# Patient Record
Sex: FEMALE | Race: WHITE | Employment: STUDENT | URBAN - METROPOLITAN AREA
[De-identification: names, ages, dates, MRNs, and addresses within clinical notes are randomized per-mention and may not be internally consistent; named-entity substitution may affect disease eponyms.]

---

## 2023-02-22 PROBLEM — Q93.52 PHELAN-MCDERMID 22Q13 DELETION SYNDROME: Status: ACTIVE | Noted: 2023-02-22

## 2023-02-22 PROBLEM — L70.9 ACNE: Status: ACTIVE | Noted: 2023-02-22

## 2023-02-22 PROBLEM — R53.81 MALAISE AND FATIGUE: Status: RESOLVED | Noted: 2023-02-22 | Resolved: 2023-02-22

## 2023-02-22 PROBLEM — F90.9 ADHD: Status: ACTIVE | Noted: 2023-02-22

## 2023-02-22 PROBLEM — H66.90 ACUTE OTITIS MEDIA: Status: RESOLVED | Noted: 2023-02-22 | Resolved: 2023-02-22

## 2023-02-22 PROBLEM — B37.31 YEAST INFECTION OF THE VAGINA: Status: RESOLVED | Noted: 2023-02-22 | Resolved: 2023-02-22

## 2023-02-22 PROBLEM — E55.9 VITAMIN D DEFICIENCY: Status: RESOLVED | Noted: 2023-02-22 | Resolved: 2023-02-22

## 2023-02-22 PROBLEM — H61.22 IMPACTED CERUMEN OF LEFT EAR: Status: RESOLVED | Noted: 2023-02-22 | Resolved: 2023-02-22

## 2023-02-22 PROBLEM — R53.83 MALAISE AND FATIGUE: Status: RESOLVED | Noted: 2023-02-22 | Resolved: 2023-02-22

## 2023-02-22 PROBLEM — G43.909 MIGRAINES: Status: ACTIVE | Noted: 2023-02-22

## 2023-02-22 PROBLEM — F41.9 ANXIETY: Status: ACTIVE | Noted: 2023-02-22

## 2023-02-22 PROBLEM — K59.09 CHRONIC CONSTIPATION: Status: ACTIVE | Noted: 2023-02-22

## 2023-02-22 PROBLEM — F84.0 AUTISM (HHS-HCC): Status: ACTIVE | Noted: 2023-02-22

## 2023-02-22 PROBLEM — F42.9 OBSESSIVE-COMPULSIVE DISORDER, UNSPECIFIED: Status: ACTIVE | Noted: 2023-02-22

## 2023-02-22 PROBLEM — G47.9 SLEEP DISORDER: Status: RESOLVED | Noted: 2023-02-22 | Resolved: 2023-02-22

## 2023-02-22 PROBLEM — L30.9 ECZEMA: Status: ACTIVE | Noted: 2023-02-22

## 2023-02-22 PROBLEM — R73.9 BORDERLINE HYPERGLYCEMIA: Status: RESOLVED | Noted: 2023-02-22 | Resolved: 2023-02-22

## 2023-02-22 PROBLEM — F41.3 OTHER MIXED ANXIETY DISORDERS: Status: RESOLVED | Noted: 2023-02-22 | Resolved: 2023-02-22

## 2023-02-22 RX ORDER — LEVONORGESTREL AND ETHINYL ESTRADIOL 0.15-0.03
1 KIT ORAL DAILY
COMMUNITY
Start: 2015-02-20 | End: 2023-04-05 | Stop reason: SDUPTHER

## 2023-02-22 RX ORDER — CYPROHEPTADINE HYDROCHLORIDE 4 MG/1
3 TABLET ORAL NIGHTLY
COMMUNITY
Start: 2015-02-20 | End: 2024-03-07 | Stop reason: SDUPTHER

## 2023-02-22 RX ORDER — POLYETHYLENE GLYCOL 3350 17 G/17G
34 POWDER, FOR SOLUTION ORAL DAILY
COMMUNITY
Start: 2018-09-25

## 2023-02-22 RX ORDER — TRAZODONE HYDROCHLORIDE 100 MG/1
2 TABLET ORAL NIGHTLY
COMMUNITY
Start: 2015-03-24 | End: 2024-03-07 | Stop reason: SDUPTHER

## 2023-02-22 RX ORDER — CLONIDINE HYDROCHLORIDE 0.1 MG/1
0.5 TABLET ORAL EVERY MORNING
COMMUNITY
Start: 2021-06-04 | End: 2024-03-07

## 2023-02-22 RX ORDER — NORTRIPTYLINE HYDROCHLORIDE 50 MG/1
1 CAPSULE ORAL NIGHTLY
COMMUNITY
Start: 2019-03-01 | End: 2024-05-21 | Stop reason: SDUPTHER

## 2023-02-22 RX ORDER — CLONIDINE HYDROCHLORIDE 0.1 MG/1
2 TABLET ORAL NIGHTLY
COMMUNITY
End: 2024-03-07

## 2023-02-22 RX ORDER — ASPIRIN 81 MG
5 TABLET, DELAYED RELEASE (ENTERIC COATED) ORAL AS NEEDED
COMMUNITY
Start: 2019-10-28

## 2023-02-22 RX ORDER — ACETYLCYSTEINE 600 MG
2 CAPSULE ORAL 3 TIMES DAILY
COMMUNITY
Start: 2019-03-01

## 2023-02-22 RX ORDER — ACETAMINOPHEN 500 MG
1 TABLET ORAL NIGHTLY
COMMUNITY
Start: 2019-10-18

## 2023-02-22 RX ORDER — CLONAZEPAM 0.5 MG/1
0.25 TABLET ORAL 2 TIMES DAILY PRN
COMMUNITY
Start: 2020-11-30 | End: 2023-10-26 | Stop reason: SINTOL

## 2023-02-24 PROBLEM — F79 INTELLECTUAL DISABILITY: Status: ACTIVE | Noted: 2023-02-24

## 2023-03-23 DIAGNOSIS — N30.00 ACUTE CYSTITIS WITHOUT HEMATURIA: Primary | ICD-10-CM

## 2023-03-23 RX ORDER — NITROFURANTOIN 25; 75 MG/1; MG/1
100 CAPSULE ORAL 2 TIMES DAILY
Qty: 10 CAPSULE | Refills: 0 | Status: SHIPPED | OUTPATIENT
Start: 2023-03-23 | End: 2023-03-28

## 2023-03-23 NOTE — PROGRESS NOTES
# presumed UTI  - UA/UCx  - Macrobid 100mg BID x 5 days  - abx subject to change/stop based on culture results

## 2023-04-03 ASSESSMENT — PROMIS GLOBAL HEALTH SCALE
EMOTIONAL_PROBLEMS: SOMETIMES
RATE_QUALITY_OF_LIFE: GOOD
RATE_MENTAL_HEALTH: FAIR
RATE_AVERAGE_PAIN: 0
RATE_GENERAL_HEALTH: GOOD
CARRYOUT_PHYSICAL_ACTIVITIES: COMPLETELY
CARRYOUT_SOCIAL_ACTIVITIES: GOOD
RATE_SOCIAL_SATISFACTION: GOOD
RATE_AVERAGE_FATIGUE: MILD
RATE_PHYSICAL_HEALTH: GOOD

## 2023-04-05 ENCOUNTER — OFFICE VISIT (OUTPATIENT)
Dept: PRIMARY CARE | Facility: CLINIC | Age: 23
End: 2023-04-05
Payer: MEDICAID

## 2023-04-05 VITALS
DIASTOLIC BLOOD PRESSURE: 84 MMHG | HEART RATE: 80 BPM | BODY MASS INDEX: 31 KG/M2 | SYSTOLIC BLOOD PRESSURE: 127 MMHG | OXYGEN SATURATION: 95 % | TEMPERATURE: 97.6 F | WEIGHT: 175 LBS

## 2023-04-05 DIAGNOSIS — Z00.00 HEALTHCARE MAINTENANCE: Primary | ICD-10-CM

## 2023-04-05 DIAGNOSIS — Q93.52 PHELAN-MCDERMID 22Q13 DELETION SYNDROME: ICD-10-CM

## 2023-04-05 DIAGNOSIS — I42.9 CARDIOMYOPATHY, UNSPECIFIED TYPE (MULTI): ICD-10-CM

## 2023-04-05 DIAGNOSIS — Z30.9 ENCOUNTER FOR CONTRACEPTIVE MANAGEMENT, UNSPECIFIED TYPE: ICD-10-CM

## 2023-04-05 PROCEDURE — 99395 PREV VISIT EST AGE 18-39: CPT | Performed by: STUDENT IN AN ORGANIZED HEALTH CARE EDUCATION/TRAINING PROGRAM

## 2023-04-05 RX ORDER — LEVONORGESTREL AND ETHINYL ESTRADIOL 0.15-0.03
1 KIT ORAL DAILY
Qty: 91 TABLET | Refills: 3 | Status: SHIPPED | OUTPATIENT
Start: 2023-04-05 | End: 2024-03-07 | Stop reason: SDUPTHER

## 2023-04-05 NOTE — PROGRESS NOTES
Subjective   Patient ID: Elsa Bell is a 23 y.o. female who presents for Annual Exam.    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    Elsa here today with mother Anderson . She is doing well at her adult day care program. Had a recent UTI that was treated with Macrobid, back to her baseline. No hospitalizations or ED visits.     Re: Autism - essentially nonverbal. No longer on stimulants, has done wonders for her anxiety. See psych and neuro notes; on a few meds and doing well.     Re: CM? - most recent TTE stable 2019. No BP issues. No CP. Energy levels are OK.     Re: GI - uses miralax twice a day, has loose stools or else she'll be constipated and won't have any BMs. Struggles with urinary incontinence on occasion as well.    PMHx, FHx, Social Hx, Surg Hx personally reviewed at this appointment. No pertinent findings and/or changes from prior (if applicable).    ROS: Denies wt gain/loss f/c HA LoC CP SOB NVDC. See HPI above, and scanned sheet (if applicable). All other systems are reviewed and are without complaint.             Review of Systems    Objective   /84   Pulse 80   Temp 36.4 °C (97.6 °F)   Wt 79.4 kg (175 lb)   SpO2 95%   BMI 31.00 kg/m²     Physical Exam  Gen: syndromic appearance. Largely nonverbal.   HEENT: NC/AT. Anicteric sclera, symmetric pupils. MMM no thrush.  Neck: Soft, supple. No LAD. No goiter.   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    Relevant Orders    Transthoracic Echo (TTE) Complete    CBC and Auto Differential    Comprehensive Metabolic Panel    TSH with reflex to Free T4 if abnormal    Lipid Panel     Other  Visit Diagnoses       Healthcare maintenance    -  Primary    Relevant Medications    levonorgestreL-ethinyl estrad (Seasonale) 0.15 mg-30 mcg (91) tablet    Other Relevant Orders    Transthoracic Echo (TTE) Complete    CBC and Auto Differential    Comprehensive Metabolic Panel    TSH with reflex to Free T4 if abnormal    Lipid Panel    Encounter for contraceptive management, unspecified type        Relevant Medications    levonorgestreL-ethinyl estrad (Seasonale) 0.15 mg-30 mcg (91) tablet    Cardiomyopathy, unspecified type (CMS/HCC)        Relevant Orders    Transthoracic Echo (TTE) Complete    CBC and Auto Differential    Comprehensive Metabolic Panel    TSH with reflex to Free T4 if abnormal    Lipid Panel

## 2023-04-05 NOTE — PATIENT INSTRUCTIONS
Please stop at the lab ([address]) to complete your blood and/or urine work that I've ordered for you.    I will contact you with the results at my soonest convenience. I strongly urge you to use "[employer] as this is the quickest and easiest way to access your results and recieve my correspondences.     I have ordered an echocardiogram to better evaluate your heart. Please stop by the cardiology department on the third floor or call [phone] to schedule this study.

## 2023-04-21 ENCOUNTER — LAB (OUTPATIENT)
Dept: LAB | Facility: LAB | Age: 23
End: 2023-04-21
Payer: MEDICAID

## 2023-04-21 DIAGNOSIS — Q93.52 PHELAN-MCDERMID 22Q13 DELETION SYNDROME: ICD-10-CM

## 2023-04-21 DIAGNOSIS — Z00.00 HEALTHCARE MAINTENANCE: ICD-10-CM

## 2023-04-21 DIAGNOSIS — I42.9 CARDIOMYOPATHY, UNSPECIFIED TYPE (MULTI): ICD-10-CM

## 2023-04-21 LAB
ALANINE AMINOTRANSFERASE (SGPT) (U/L) IN SER/PLAS: 20 U/L (ref 7–45)
ALBUMIN (G/DL) IN SER/PLAS: 4 G/DL (ref 3.4–5)
ALKALINE PHOSPHATASE (U/L) IN SER/PLAS: 56 U/L (ref 33–110)
ANION GAP IN SER/PLAS: 10 MMOL/L (ref 10–20)
ASPARTATE AMINOTRANSFERASE (SGOT) (U/L) IN SER/PLAS: 16 U/L (ref 9–39)
BASOPHILS (10*3/UL) IN BLOOD BY AUTOMATED COUNT: 0.04 X10E9/L (ref 0–0.1)
BASOPHILS/100 LEUKOCYTES IN BLOOD BY AUTOMATED COUNT: 0.6 % (ref 0–2)
BILIRUBIN TOTAL (MG/DL) IN SER/PLAS: 0.4 MG/DL (ref 0–1.2)
CALCIUM (MG/DL) IN SER/PLAS: 9.2 MG/DL (ref 8.6–10.6)
CARBON DIOXIDE, TOTAL (MMOL/L) IN SER/PLAS: 28 MMOL/L (ref 21–32)
CHLORIDE (MMOL/L) IN SER/PLAS: 107 MMOL/L (ref 98–107)
CHOLESTEROL (MG/DL) IN SER/PLAS: 145 MG/DL (ref 0–199)
CHOLESTEROL IN HDL (MG/DL) IN SER/PLAS: 42.4 MG/DL
CHOLESTEROL/HDL RATIO: 3.4
CREATININE (MG/DL) IN SER/PLAS: 0.79 MG/DL (ref 0.5–1.05)
EOSINOPHILS (10*3/UL) IN BLOOD BY AUTOMATED COUNT: 0.08 X10E9/L (ref 0–0.7)
EOSINOPHILS/100 LEUKOCYTES IN BLOOD BY AUTOMATED COUNT: 1.3 % (ref 0–6)
ERYTHROCYTE DISTRIBUTION WIDTH (RATIO) BY AUTOMATED COUNT: 12.8 % (ref 11.5–14.5)
ERYTHROCYTE MEAN CORPUSCULAR HEMOGLOBIN CONCENTRATION (G/DL) BY AUTOMATED: 32.4 G/DL (ref 32–36)
ERYTHROCYTE MEAN CORPUSCULAR VOLUME (FL) BY AUTOMATED COUNT: 93 FL (ref 80–100)
ERYTHROCYTES (10*6/UL) IN BLOOD BY AUTOMATED COUNT: 4.4 X10E12/L (ref 4–5.2)
GFR FEMALE: >90 ML/MIN/1.73M2
GLUCOSE (MG/DL) IN SER/PLAS: 92 MG/DL (ref 74–99)
HEMATOCRIT (%) IN BLOOD BY AUTOMATED COUNT: 40.7 % (ref 36–46)
HEMOGLOBIN (G/DL) IN BLOOD: 13.2 G/DL (ref 12–16)
IMMATURE GRANULOCYTES/100 LEUKOCYTES IN BLOOD BY AUTOMATED COUNT: 0.3 % (ref 0–0.9)
LDL: 79 MG/DL (ref 0–119)
LEUKOCYTES (10*3/UL) IN BLOOD BY AUTOMATED COUNT: 6.3 X10E9/L (ref 4.4–11.3)
LYMPHOCYTES (10*3/UL) IN BLOOD BY AUTOMATED COUNT: 2.06 X10E9/L (ref 1.2–4.8)
LYMPHOCYTES/100 LEUKOCYTES IN BLOOD BY AUTOMATED COUNT: 33 % (ref 13–44)
MONOCYTES (10*3/UL) IN BLOOD BY AUTOMATED COUNT: 0.36 X10E9/L (ref 0.1–1)
MONOCYTES/100 LEUKOCYTES IN BLOOD BY AUTOMATED COUNT: 5.8 % (ref 2–10)
NEUTROPHILS (10*3/UL) IN BLOOD BY AUTOMATED COUNT: 3.69 X10E9/L (ref 1.2–7.7)
NEUTROPHILS/100 LEUKOCYTES IN BLOOD BY AUTOMATED COUNT: 59 % (ref 40–80)
NRBC (PER 100 WBCS) BY AUTOMATED COUNT: 0 /100 WBC (ref 0–0)
PLATELETS (10*3/UL) IN BLOOD AUTOMATED COUNT: 242 X10E9/L (ref 150–450)
POTASSIUM (MMOL/L) IN SER/PLAS: 4 MMOL/L (ref 3.5–5.3)
PROTEIN TOTAL: 6.7 G/DL (ref 6.4–8.2)
SODIUM (MMOL/L) IN SER/PLAS: 141 MMOL/L (ref 136–145)
THYROTROPIN (MIU/L) IN SER/PLAS BY DETECTION LIMIT <= 0.05 MIU/L: 0.53 MIU/L (ref 0.44–3.98)
TRIGLYCERIDE (MG/DL) IN SER/PLAS: 119 MG/DL (ref 0–149)
UREA NITROGEN (MG/DL) IN SER/PLAS: 13 MG/DL (ref 6–23)
VLDL: 24 MG/DL (ref 0–40)

## 2023-04-21 PROCEDURE — 80061 LIPID PANEL: CPT

## 2023-04-21 PROCEDURE — 84443 ASSAY THYROID STIM HORMONE: CPT

## 2023-04-21 PROCEDURE — 85025 COMPLETE CBC W/AUTO DIFF WBC: CPT

## 2023-04-21 PROCEDURE — 80053 COMPREHEN METABOLIC PANEL: CPT

## 2023-04-21 PROCEDURE — 36415 COLL VENOUS BLD VENIPUNCTURE: CPT

## 2023-04-30 ENCOUNTER — PATIENT MESSAGE (OUTPATIENT)
Dept: PRIMARY CARE | Facility: CLINIC | Age: 23
End: 2023-04-30
Payer: MEDICAID

## 2023-04-30 DIAGNOSIS — N30.00 ACUTE CYSTITIS WITHOUT HEMATURIA: Primary | ICD-10-CM

## 2023-05-04 ENCOUNTER — LAB (OUTPATIENT)
Dept: LAB | Facility: LAB | Age: 23
End: 2023-05-04
Payer: MEDICAID

## 2023-05-04 DIAGNOSIS — N30.00 ACUTE CYSTITIS WITHOUT HEMATURIA: ICD-10-CM

## 2023-05-04 LAB
BACTERIA, URINE: ABNORMAL /HPF
MUCUS, URINE: ABNORMAL /LPF
RBC, URINE: ABNORMAL /HPF (ref 0–5)
SQUAMOUS EPITHELIAL CELLS, URINE: <1 /HPF
TRANSITIONAL EPITHELIAL CELLS, URINE: <1 /HPF
WBC, URINE: 1 /HPF (ref 0–5)

## 2023-05-04 PROCEDURE — 81001 URINALYSIS AUTO W/SCOPE: CPT

## 2023-05-04 PROCEDURE — 87086 URINE CULTURE/COLONY COUNT: CPT

## 2023-05-05 LAB — URINE CULTURE: NORMAL

## 2023-10-04 ENCOUNTER — TELEMEDICINE (OUTPATIENT)
Dept: BEHAVIORAL HEALTH | Facility: CLINIC | Age: 23
End: 2023-10-04
Payer: MEDICAID

## 2023-10-04 DIAGNOSIS — Q93.52 PHELAN-MCDERMID 22Q13 DELETION SYNDROME: ICD-10-CM

## 2023-10-04 DIAGNOSIS — F42.8 OTHER OBSESSIVE-COMPULSIVE DISORDERS: Primary | Chronic | ICD-10-CM

## 2023-10-04 DIAGNOSIS — F84.0 AUTISM SPECTRUM DISORDER (HHS-HCC): ICD-10-CM

## 2023-10-04 DIAGNOSIS — K59.09 CHRONIC CONSTIPATION: ICD-10-CM

## 2023-10-04 DIAGNOSIS — G47.9 SLEEP DISORDER: ICD-10-CM

## 2023-10-04 DIAGNOSIS — F41.3 OTHER MIXED ANXIETY DISORDERS: ICD-10-CM

## 2023-10-04 DIAGNOSIS — F79 INTELLECTUAL DISABILITY: ICD-10-CM

## 2023-10-04 DIAGNOSIS — F90.2 ATTENTION DEFICIT HYPERACTIVITY DISORDER (ADHD), COMBINED TYPE: ICD-10-CM

## 2023-10-04 PROCEDURE — 99214 OFFICE O/P EST MOD 30 MIN: CPT | Performed by: STUDENT IN AN ORGANIZED HEALTH CARE EDUCATION/TRAINING PROGRAM

## 2023-10-04 NOTE — PATIENT INSTRUCTIONS
AFTER VISIT SUMMARY      Date: 10/04/2023  Appointment with Psychiatrist - Dr. Rodriguez        Reason for Visit:  -Routine follow-up/Medication management  --Recent behavioral outburst        Discussed during Appointment:   -Behavioral concerns   -Sleep  -Seizures and EEG findings  -Possible causes of outbursts  -Medication  -Strategies for managing challenging behavioral outbursts        Clinical Impression/Status Update:  Patient had a recent episode of a behavioral outburst but reportedly back to her psychiatric/behavioral baseline  -Medication regimen appropriately effective without report of significant side effects          INSTRUCTIONS/RECOMMENDATIONS      #Medication   -Medication changes made today:   --We are starting you on Ativan 1 mg as needed up to twice a day for agitation  --We are discontinuing your Klonopin 0.5 mg       Please note:   Prescriptions will be sent to your pharmacy with enough refills to last until your next appointment. For established patients, we typically provide 6 refills for regular meds and 3 refills for controlled substances (e.g., ADHD stimulant medication, benzodiazepines such as lorazepam). We will not provide additional refills beyond that without having an appointment. You can schedule an appointment by calling our office at [phone].     #Labs  Labs needed to make sure there are no side effects to medications  Get them done 1 week before next appointment  Labs ordered, you do not need a printed lab order. Needs to be done at a  lab  You can find the nearest location here:  [email or web address]   Patient to fast for 12 hours prior to blood draw.      #To-Do Prior to Next Visit:   -Please get bloodwork/labs done at least one week prior to your next appointment  -Download the e[employer]ise erum to track symptoms          #Follow-up  --We would like to see you again in about 3 weeks  --> Please call the office ([phone]) to  schedule an appointment at your earliest convenience.    -If having new/worsening symptoms, please call the clinic ([phone]) to discuss being seen sooner     Please note:  We ask that you please schedule a visit if needing paperwork filled out by the doctor (e.g., Expert Eval, FMLA form)

## 2023-10-04 NOTE — PROGRESS NOTES
PRESENT FOR APPOINTMENT  -Mother (Bethany)      LAST INTERVENTION  No med changes        HISTORY OF PRESENT ILLNESS    -Interval Change  The past week, there has been episodes of behavioral outbursts and sleep changes.         -Anxiety  Stable. No reported difference in her worrying and perseverating. Instead, she has been more impulsive and impatient which is similar to her last visit.  Recall from prior: Baseline anxiety typically consists of asking questions repeatedly and getting preoccupied with something that is worrisome to her.       -Behavior  Worse.   -Behavioral outburst: Mother reports an episode of challenging behavior this past Saturday. A week prior, mother noticed behavior ramping up. Sitting up, and going back to bed as reviewed by video camera. Mother noted that patient wasn't awake. Was uncharacteristically agitated, leaped over furniture and broke down the door and destroyed property. Given safety concerns, neighbors offered 0.5 mg ativan and after an hour, she calmed down but was not sedated and she remained functional, ambulating fine. Went to bed tossing and turning and became agitated at 2 am and other 0.5 was given with good results. The following day, she slept most of the day and night and has returned to her psychiatric/behavioral baseline and has remained at baseline.      Mother did not report any other signs/symptoms suggesting organic etiology of behavior exacerbation aside from sleep disturbance.     There was a concern for staring spells and possible seizures when patient was a child. Has series of EEGs There were some results that suggested signs of seizures. Eventually, it was decided not to be treated and eventually went away before her diagnosis of [place] McDermid syndrome. Last normal EEG was in 2006.    Recall from prior: patient is described as having times where she will ignore and/or refuse to follow instructions and engage in desired activity on her own terms instead. There is  no report of significant irritability or argumentativeness in response to instructions. Instead, mother explains that patient does what she wants when she wants to. Will not wait patiently like before. Also, will reportedly “do what she needs to” in order to be able to do what she wants, including pushing/shoving and at times hitting others. Once patient has done what she wanted to do, the behavior terminates on its own and in most cases patient will be her usual self the rest of the time.   Overall course of this sort of behavior has varied in presence/frequency. Comes and goes. Can last up to a few days. Typically resolves in less than a week. Occurs in finite episodes rather than all the time during those days. After an incident patient goes back to her usual self the rest of the day. Severity has remained stable since onset. This behavior per mum never gets severe or frequent enough to call our office about challenging behavior.  No report of property destruction or significant self-injurious behavior.      -Sleep  As prior, reports of tossing and turning while sleeping. Recall from prior: Regular sleep schedule, adequate sleep time (at least 8 hours most nights), restful. No report of morning sedation.       -Medication  Endorses medication adherence. No report of excessive sedation, dizziness, tremors, stiffness, drooling, shuffling gait. No report of significant issues with constipation recently.   0.5 mg ativan = after an hour, she calmed down but was not sedated and she remained functional, ambulating fine. Went to bed tossing and turning and became agitated at 2 am and other 0.5 was given with good results.       -Health  No report of hospitalizations, ED visits, new/worsening medical issues, or changes in non-psych medication since last visit. Reports being fully covid vaccinated. Rare migraines consistent with baseline.    No report of signs/symptoms suggesting organic etiology of behavior exacerbation  aside from sleep disturbance.         REVIEW OF SYMPTOMS  -Depression: negative  -Anxiety: see HPI  -Psychosis: negative  -Kathrine: negative  -Aggression: see HPI  -Self-injury: skin-picking, no worse than baseline  -Sleep: as per HPI  -Appetite: No issues reported. No report of pica. No changes from baseline  -Medical ROS: patient does not endorse difficulty urinating, constipation, seizures, rash, or polydipsia.  *All other systems have been reviewed and are negative for complaint unless otherwise noted above      PSYCHIATRIC HISTORY  No prior hospitalizations    MEDICATION HISTORY  Amitriptyline - worse aggression?  Clomipramine - worse aggression?  MPH stimulants (Metadate) - unknown outcome  Guanfacine - unclear benefit  Vyvanse - helpful initially but recently causing some aggression    MEDICAL HISTORY  PCP: Dr. Aidan Vázquez (peds)  No h/o seizures  Constipation  Cyclical vomiting syndrome  Migraine headaches    FAMILY PSYCHIATRIC HISTORY  Older brother and sister with depression and anxiety, respectively  No reported history of neurodevelopmental disorders    SOCIAL HISTORY  Living situation: Lives with mom  School, work, training: [employer] 5 days/week  Guardianship status: Mom  Trauma history: Not discussed today  Tobacco: None  Alcohol: None  Non-rx drugs: None   Caffeine: None      Mental Status Exam  General: adequate hygiene/grooming.  Behavior: distant, mildly avoidant  Communication: Stereotypic words/phrases.  Motor: No involuntary movements.  MSK: No TD, tics, or tremor appreciated. AIMS 0.   Mood: Euthymic  Affect: Full range. Appropriately reactive; smiles/laughs when watching something funny.  Thought processes: Coherent. [place]  Thought content: Unable to assess due to limited verbal abilities and ID  Perception: Does not appear to be experiencing hallucinations.  Orientation: Responds to her name.  Intellectual ability: Impaired.  Attention/concentration: alert; attentive.  Insight:  Poor  Judgment: Poor         RISK ASSESSMENT   Patient felt to be at low acute and imminent risk of harm to self and others based on consideration of her risk and protective factors, as well as evaluation of her current clinical condition. She does not currently meet criteria for inpatient psychiatric hospitalization given that she does not exhibit evidence of significant deterioration in baseline psychiatric illness, aggression, self-injury, and ability to care for self. There is no evidence that patient’s current caregivers/living environment are unable to safely manage patient’s behavior. Overall risk mitigated by continued psychiatric follow-up care, psychopharmacologic intervention, 24/7 supervision, and [facility] [place]. Patient/caregivers are aware of emergency psychiatric resources available in the event of an acute psychiatric emergency, Mobile Crisis, 911, and local ER.        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         AS OF THIS APPOINTMENT:  Report of recent challenging behavior, however, patient is reported to be back to behavioral and psychiatric baseline at this time. Report of good response to Ativan prn so will place order for that and discotninue Klonopin as that was not effective. Will continue other medications and maintain close follow-up.     -Continue scheduled medication.   -Add Ativan 1 mg up to twice a day PRN for agitation.   -Discontinue Klonopin 0.5 mg PRN as this was ineffective   -Will plan to order standing labs for medical workup in case of future behavioral exacerbations  -Patient to download bearable erum to track symptoms  -Follow up in 3 to 4 weeks  -Will plan to look at the literature with regards to sleep and seizures related to [place] McDermid syndrome.        Diagnoses:  -[place]-McDermid syndrome  -Moderate Intellectual disability  -ASD  -OCD  -GRACE  -ADHD      PLAN**MED CHANGES**  Visit  of high complexity given patient’s limited communication ability and intellectual disability, and the resulting need for the presence of a third party (mother) to provide clinical information and history.     #Medication Management:   -Start  --Lorazepam 1 mg PRN up to twice a day for agitation  -Discontinue Klonopin 0.5 mg PRN  -Continue   -Clonidine PRN 0.1mg bid for anxiety/agitation  --Take half tablet PRN bid. If minimal response within about 45 minutes, give another half tablet  - Continue Trazodone to 200mg  - Continue nortriptyline 50mg QHS for ADHD and migraines  - Continue cyproheptadine 12mg QHS for migraines  - Continue NAC 1000mg TID for OCD/skin picking    #Medication Considerations:  -Risks, benefits, alternatives, off-label uses, black box warnings, and frequent/important side effects of medications have been discussed with patient/caregiver. Parent/guardian has voiced understanding and agreement with recommended use of psychotropic medication. Patient has assented to the extent possible with recommended use of psychotropic medication.  -Patient/caregiver asked to call [phone] when needing refills, with at least one week’s notice    -Medical necessity for treatment with psychotropic medication:   [x] Symptom reduction    [x] Improvement in functioning   [x] Reduce risk of harm to self/others   [ ] Maintenance therapy to prevent deterioration in functioning     -Reason for not reducing medication dose at this time:     [x] Significant risk of deterioration in functioning    [x] Concern for elevating risk of harm to self/others   [ ] Prior dose reduction unsuccessful and/or harmful   [ ] Medication regimen recently modified    [x] Psychiatric/behavioral condition not adequately stabilized/optimized    [ ] Other     There is no report of signs/symptoms consistent with medication-induced impairment in daily functioning. At this time, benefits of medication felt to outweigh potential risks. But we  will continue to reassess need for psychotropic medication at regular 3 to 6 month intervals, or sooner as clinically indicated.    -OARRS: I have personally reviewed patient’s OARRS report. I have considered the risks of abuse, dependence, addiction, and diversion and feel that the potential benefits of treatment with a controlled substance currently outweigh the potential risks.   Last checked OARRS on 05/22/2023.      #Medication Monitoring:   -Patient not currently taking medications requiring routine laboratory monitoring         #Have provided psychoeducation on sleep hygiene, medication, etiology of behavioral issues, behavioral interventions      #Counseling Provided   [x] Diagnostic impression/prognosis    [x] Risks and benefits of treatment options   [x] Instruction for management/treatment and follow-up   [x] Educated patient/caregiver about: behavioral interventions, harm reduction, safety planning       #Coordination of care provided   [x] Family/Caregiver [ ] Guardian [ ] Physician    [ ] Residential Staff [ ] Nursing staff [ ]     [ ] SSA/ [ ] Therapist [ ] Pharmacist      #MDM/Complexity Issues   [ ] Chronic medical comorbidities    [ ] Chronic risk of harm to self/others    [x] Intellectual disability    [x] Need for independent historian due to impaired communication abilities and intellectual disability    [x] Controlled substance medication requiring regular monitoring    [ ] Medication requiring significant ongoing monitoring for toxicity      Follow up in 3 weeks, or sooner if new/worsening symptoms    Time:  Prep time on date of the patient encounter: 10 minutes  Time spent directly with patient/family/caregiver: 40 minutes  Additional time spent on patient care activities: 10 minutes   Documentation time: 10 minutes  Total time on date of patient encounter: 70 minutes        Scribe Attestation  By signing my name below, IArely , Scribe   attest that this  documentation has been prepared under the direction and in the presence of Ilana Rodriguez DO.

## 2023-10-10 ENCOUNTER — TELEPHONE (OUTPATIENT)
Dept: BEHAVIORAL HEALTH | Facility: CLINIC | Age: 23
End: 2023-10-10
Payer: MEDICAID

## 2023-10-13 RX ORDER — LORAZEPAM 1 MG/1
TABLET ORAL
Qty: 45 TABLET | Refills: 1 | Status: SHIPPED | OUTPATIENT
Start: 2023-10-13

## 2023-10-25 PROBLEM — G47.9 SLEEP DISORDER: Status: ACTIVE | Noted: 2023-10-25

## 2023-10-25 PROBLEM — F41.3 OTHER MIXED ANXIETY DISORDERS: Status: ACTIVE | Noted: 2023-10-25

## 2023-10-25 PROBLEM — F42.8 OTHER OBSESSIVE-COMPULSIVE DISORDER: Status: ACTIVE | Noted: 2023-02-22

## 2023-10-26 ENCOUNTER — TELEMEDICINE (OUTPATIENT)
Dept: BEHAVIORAL HEALTH | Facility: CLINIC | Age: 23
End: 2023-10-26
Payer: MEDICAID

## 2023-10-26 DIAGNOSIS — F84.0 AUTISM SPECTRUM DISORDER (HHS-HCC): ICD-10-CM

## 2023-10-26 DIAGNOSIS — F79 INTELLECTUAL DISABILITY: ICD-10-CM

## 2023-10-26 DIAGNOSIS — F42.8 OTHER OBSESSIVE-COMPULSIVE DISORDER: Primary | ICD-10-CM

## 2023-10-26 DIAGNOSIS — F41.3 OTHER MIXED ANXIETY DISORDERS: ICD-10-CM

## 2023-10-26 DIAGNOSIS — Q93.52 PHELAN-MCDERMID 22Q13 DELETION SYNDROME: ICD-10-CM

## 2023-10-26 DIAGNOSIS — G47.9 SLEEP DISORDER: ICD-10-CM

## 2023-10-26 PROBLEM — I51.89 MILD LEFT VENTRICULAR SYSTOLIC DYSFUNCTION: Status: ACTIVE | Noted: 2023-10-26

## 2023-10-26 PROBLEM — H10.9 CONJUNCTIVITIS: Status: RESOLVED | Noted: 2023-10-26 | Resolved: 2023-10-26

## 2023-10-26 PROBLEM — R62.50 LACK OF EXPECTED NORMAL PHYSIOLOGICAL DEVELOPMENT: Status: RESOLVED | Noted: 2017-01-16 | Resolved: 2023-10-26

## 2023-10-26 PROCEDURE — 99214 OFFICE O/P EST MOD 30 MIN: CPT | Performed by: STUDENT IN AN ORGANIZED HEALTH CARE EDUCATION/TRAINING PROGRAM

## 2023-10-26 NOTE — PROGRESS NOTES
PRESENT FOR APPOINTMENT  -Mother (Bethany)  -pt    LAST INTERVENTION  Last seen about 3 weeks ago in October 2023. Ativan PRN was prescribed and Klonopin was discontinued. Mother was asked to download the MyFeelBack erum to track symptoms.        HISTORY OF PRESENT ILLNESS    -Interval Change  No new issues/concerns.      -Anxiety  Stable. No report of excessive worrying above baseline.   Recall from prior: Baseline anxiety typically consists of asking questions repeatedly and getting preoccupied with something that is worrisome to her.       -Behavior  Stable. No reports of behavioral outbursts above baseline.  Recall from prior:   Sitting up, and going back to bed as reviewed by video camera. Mother noted that patient wasn't awake. Agitation and destruction of property. Patient is described as having times where she will ignore and/or refuse to follow instructions and engage in desired activity on her own terms instead. Mother explains that patient does what she wants when she wants to. Will not wait patiently like before. Also, will reportedly “do what she needs to” in order to be able to do what she wants, including pushing/shoving and at times hitting others. Once patient has done what she wanted to do, the behavior terminates on its own and in most cases patient will be her usual self the rest of the time. Overall course of this sort of behavior has varied in presence/frequency. Comes and goes. Can last up to a few days. Typically resolves in less than a week. Occurs in finite episodes rather than all the time during those days. After an incident patient goes back to her usual self the rest of the day. Severity has remained stable since onset. This behavior per mum never gets severe or frequent enough to call our office about challenging behavior. No report of significant self-injurious behavior.      -Sleep  Stable. Between 12 and 5 am, mother reports 4 to 10 awakenings and then goes back to sleep. As prior, reports  of tossing and turning while sleeping. One night, patient stayed up for one full hour. Recall from prior: Regular sleep schedule, adequate sleep time (at least 8 hours most nights), restful. No report of morning sedation.       -Medication  Mother reportedly gave her Ativan one time since the last visit and that she did not notice it's effect right away. Mother reports increased sedation the next day.   Endorses medication adherence. No report of excessive sedation, dizziness, tremors, stiffness, drooling, shuffling gait. No report of significant issues with constipation recently.       -Health  Stable. Mother reports that she wants patient to get a thyroid panel given her family history of thyroid conditions.  No report of hospitalizations, ED visits, new/worsening medical issues, or changes in non-psych medication since last visit. Reports being fully covid vaccinated. Rare migraines consistent with baseline.  No report of signs/symptoms suggesting organic etiology of behavior exacerbation aside from sleep disturbance.         REVIEW OF SYMPTOMS  -Depression: negative  -Anxiety: see HPI  -Psychosis: negative  -Kathrine: negative  -Aggression: see HPI  -Self-injury: skin-picking, no worse than baseline  -Sleep: as per HPI  -Appetite: No issues reported. No report of pica. No changes from baseline  -Medical ROS: patient does not endorse difficulty urinating, constipation, seizures, rash, or polydipsia.  *All other systems have been reviewed and are negative for complaint unless otherwise noted above      PSYCHIATRIC HISTORY  No prior hospitalizations  There was a concern for staring spells and possible seizures when patient was a child. Has series of EEGs There were some results that suggested signs of seizures. 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 Last normal EEG was in 2006.      MEDICATION HISTORY  Amitriptyline - worse  aggression?  Clomipramine - worse aggression?  MPH stimulants (Metadate) - unknown outcome  Guanfacine - unclear benefit  Vyvanse - helpful initially but recently causing some aggression      MEDICAL HISTORY  PCP: Dr. 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  Behavior: distant, mildly avoidant  Communication: Stereotypic words/phrases.  Motor: No involuntary movements.  MSK: No TD, tics, or tremor appreciated. AIMS 0.   Mood: Euthymic  Affect: Full range. Appropriately reactive; smiles/laughs when watching something funny.  Thought processes: Coherent. [place]  Thought content: Unable to assess due to limited verbal abilities and ID  Perception: Does not appear to be experiencing hallucinations.  Orientation: Responds to her name.  Intellectual ability: Impaired.  Attention/concentration: alert; attentive.  Insight: Poor  Judgment: Poor         RISK ASSESSMENT   Patient felt to be at low acute and imminent risk of harm to self and others based on consideration of her risk and protective factors, as well as evaluation of her current clinical condition. She does not currently meet criteria for inpatient psychiatric hospitalization given that she does not exhibit evidence of significant deterioration in baseline psychiatric  illness, aggression, self-injury, and ability to care for self. There is no evidence that patient’s current caregivers/living environment are unable to safely manage patient’s behavior. Overall risk mitigated by continued psychiatric follow-up care, psychopharmacologic intervention, 24/7 supervision, and [facility] services. Patient/caregivers are aware of emergency psychiatric resources available in the event of an acute psychiatric emergency, Mobile Crisis, 911, and local ER.      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       ###  AS OF THIS APPOINTMENT:  Patient appears to be close to her psychiatric and behavioral baseline. Mother reported excessive sedation the next day after taking Ativan so will try prn Valium instead  As prior, reports of tossing and turning while sleeping. Discussed the possibility of a sleep study with mother. Mother noted reservations about patient's ability to sleep outside the home. Will look into getting a child life specialist to help with the process.   Mother expressing desire for patient to see endocrinologist given significant family history of thyroid issues. Of note, patient's TSH has been low normal. Will plan to provide referral if necessary  Follow up in 6 weeks.  ###      Diagnoses:  -[place]-McDermid syndrome  -Moderate Intellectual disability  -ASD  -OCD  -GRACE  -ADHD      PLAN       **MED CHANGES**  Visit of high complexity given patient’s limited communication ability and intellectual disability, and the resulting need for the presence of a third party (mother) to provide clinical information and history.     #Medication Management:   -Start  --Valium 5 mg PRN and another 5 mg if not effective within 45 minutes for agitation  -Continue   --Lorazepam 1 mg PRN up to twice a day for agitation  --Clonidine PRN 0.1mg bid for anxiety/agitation  ---Take half tablet PRN bid. If  minimal response within about 45 minutes, give another half tablet  - Continue Trazodone 200mg QHS  - Continue nortriptyline 50mg QHS for ADHD and migraines  - Continue cyproheptadine 12mg QHS for migraines  - Continue NAC 1000mg TID for OCD/skin picking      #Medication Considerations:  -Risks, benefits, alternatives, off-label uses, black box warnings, and frequent/important side effects of medications have been discussed with patient/caregiver. Parent/guardian has voiced understanding and agreement with recommended use of psychotropic medication. Patient has assented to the extent possible with recommended use of psychotropic medication.  -Patient/caregiver asked to call [phone] when needing refills, with at least one week’s notice    -Medical necessity for treatment with psychotropic medication:   [x] Symptom reduction    [x] Improvement in functioning   [x] Reduce risk of harm to self/others   [ ] Maintenance therapy to prevent deterioration in functioning     -Reason for not reducing medication dose at this time:     [x] Significant risk of deterioration in functioning    [x] Concern for elevating risk of harm to self/others   [ ] Prior dose reduction unsuccessful and/or harmful   [ ] Medication regimen recently modified    [x] Psychiatric/behavioral condition not adequately stabilized/optimized    [ ] Other     There is no report of signs/symptoms consistent with medication-induced impairment in daily functioning. At this time, benefits of medication felt to outweigh potential risks. But we will continue to reassess need for psychotropic medication at regular 3 to 6 month intervals, or sooner as clinically indicated.    -OARRS: I have personally reviewed patient’s OARRS report. I have considered the risks of abuse, dependence, addiction, and diversion and feel that the potential benefits of treatment with a controlled substance currently outweigh the potential risks.   Last checked OARRS on  05/22/2023.      #Medication Monitoring:   -Patient not currently taking medications requiring routine laboratory monitoring   --TSH done about 6 months ago was low normal.      #Have provided psychoeducation on sleep hygiene, medication, etiology of behavioral issues, behavioral interventions      #Counseling Provided   [x] Diagnostic impression/prognosis    [x] Risks and benefits of treatment options   [x] Instruction for management/treatment and follow-up   [x] Educated patient/caregiver about: behavioral interventions, harm reduction, safety planning       #Coordination of care provided   [x] Family/Caregiver [ ] Guardian [ ] Physician    [ ] Residential Staff [ ] Nursing staff [ ]     [ ] SSA/ [ ] Therapist [ ] Pharmacist      #MDM/Complexity Issues   [ ] Chronic medical comorbidities    [ ] Chronic risk of harm to self/others    [x] Intellectual disability    [x] Need for independent historian due to impaired communication abilities and intellectual disability    [x] Controlled substance medication requiring regular monitoring    [ ] Medication requiring significant ongoing monitoring for toxicity      Follow up in 6 weeks, or sooner if new/worsening symptoms    Time:  Prep time on date of the patient encounter: 10 minutes  Time spent directly with patient/family/caregiver: 40 minutes  Additional time spent on patient care activities: 10 minutes   Documentation time: 10 minutes  Total time on date of patient encounter: 70 minutes        Scribe Attestation  By signing my name below, IArely , [employer]   attest that this documentation has been prepared under the direction and in the presence of Ilana Rodriguez DO.

## 2023-10-27 NOTE — PATIENT INSTRUCTIONS
AFTER VISIT SUMMARY      Date: 10/26/2023  Appointment with Psychiatrist - Dr. Rodriguez      Reason for Visit:  -Routine follow-up/Medication management      Discussed during Appointment:   -Sleep changes and sleep study  -Child life specialist  -Medication  -Intended endocrinology appointment for thyroid check.      Clinical Impression/Status Update:  -Appears to be close to her psychiatric and behavioral baseline. However, Elsa still has the issue of tossing and turning while asleep.  ---We will contact a child life specialist to see the possibility of easing the process of a sleep study.  -We will try Valium as needed for agitation.      #Clinic Policies/Procedures  -Refills  Prescriptions will be sent to your pharmacy with enough refills to last until your next appointment. For established patients, we typically provide 6 refills for regular meds and 3 refills for controlled substances (e.g., ADHD stimulant medication, benzodiazepines such as lorazepam). We will not provide additional refills beyond that without having an appointment. You can schedule an appointment by calling our office at [phone].     -Paperwork Requests (e.g., [employer] for guardianship)  We ask that you please schedule an appointment if needing paperwork filled out by the doctor (e.g., [employer], FMLA form).  Please provide as much information as possible about your request and email the doctor any forms needing to be filled out prior to the appointment.       ===========================  INSTRUCTIONS/RECOMMENDATIONS  ===========================    #Medication   -Medication changes made today:   --We are starting you on Valium 5 mg as needed and another 5 mg if first dose is not effective within 45 minutes       #Follow-up  -We would like to see you again in about 6 weeks  --> Please call the office ([phone]) to schedule an appointment at your earliest convenience.    *If having new/worsening symptoms, please call the  [facility] ([phone]) to discuss being seen sooner

## 2024-01-05 NOTE — PROGRESS NOTES
Endocrinology  1/8/2024    History of Present Illness   Elsa Bell is a 23 y.o. year old female with medical history of Yelena-McDermid syndrome, moderate ID, ASD, OCD, GRACE, ADHD, here for thyroid evaluation.     Here today with parents - Elsa is largely nonverbal   Mom - Bethany   Dad - Carlos     History obtained from mother.   Strong family history of thyroid disease   Mother noticed that her TSH was trending down, so wanted her to be seen by endocrinology.   One new symptom she has noticed is hair loss for the last year. No patchy loss.     Weight changes: Over the winter, tends to be 10 lbs. Heavier than summer - her day program is at [employer], so more active in the summer.    Heat or cold intolerance: Some heat intolerance    Palpitations: Unknown   Tremor: Denies    Bowel changes: Chronic constipation - takes miralax daily    Difficulty sleeping: Yes - has been going on for some time    Menstrual cycles: OCP - takes continuously   Eye changes: Denies     Family history - both hypo- and hyper- thyroidism     Review of Systems   Unable to obtain     Medications     Current Outpatient Medications   Medication Instructions    acetylcysteine 600 mg capsule 2 tablets, oral, 3 times daily    carbamide peroxide (Debrox) 6.5 % otic solution 5 drops, Each Ear, As needed    cholecalciferol (Vitamin D-3) 50 mcg (2,000 unit) capsule 1 capsule, oral, Nightly    cloNIDine (Catapres) 0.1 mg tablet 2 tablets, oral, Nightly    cloNIDine (Catapres) 0.1 mg tablet 0.5 tablets, oral, Every morning    cyproheptadine (Periactin) 4 mg tablet 3 tablets, oral, Nightly    levonorgestreL-ethinyl estrad (Seasonale) 0.15 mg-30 mcg (91) tablet 1 tablet, oral, Daily    LORazepam (Ativan) 1 mg tablet Take 1 tablet (1mg), up to twice daily, as needed for PRN agitation/escalating behavior. Can repeat dose once if ineffective after 45 minutes.    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    Developmental delay 10/19/2015    Dysmenorrhea 01/12/2012    High frequency hearing loss 03/28/2003    Impaction of colon (CMS/HCC) 04/03/2012    Lack of expected normal physiological development 01/16/2017    Other mixed anxiety disorders 02/22/2023    Personal history of other diseases of the digestive system 09/29/2015    History of esophageal reflux    Personal history of other mental and behavioral disorders 04/11/2017    History of trichotillomania    Sleep disorder 02/22/2023    Unspecified urinary incontinence 02/20/2015    Diurnal enuresis    Yeast infection of the vagina 02/22/2023       Past Surgical History:   Procedure Laterality Date    OTHER SURGICAL HISTORY  03/20/2018    Craniotomy Infratentorial Excision Of Cyst       Family History   Problem Relation Name Age of Onset    NASIR disease Brother          Allergies   Allergen Reactions    Codeine Unknown     Unknown severity    Aminoglycosides Unknown     unable to take because of disease    Dextroamphetamine Unknown     neurological reaction    Cephalexin Rash        Social history:   - Nonverbal   - Lives at home with parents   - Has younger brother and older brother and sister        Physical Exam   There were no vitals taken for this visit.  General: Well appearing, no acute distress  Lungs: Breathing comfortably on room air     Labs and Imaging      Thyroid Stimulating Hormone   Latest Ref Rng 0.44 - 3.98 mIU/L   3/20/2018 0.77    8/16/2019 1.63    11/19/2019 0.72    1/27/2022 0.81    4/21/2023 0.53        Assessment and Plan   Elsa Bell is a 23 y.o. year old female with  medical history of [place]-McDermid syndrome, moderate ID, ASD, OCD, GRACE, ADHD, here for thyroid evaluation. Per mother, strong family history of both hypo and hyper thyroid. Patient is non verbal so unable to obtain ROS, but historically TSH has always been normal. From what I read, no specific endocrinopathies associated with Yelena-McDermid syndrome,.     # Screen for thyroid dysfunction:   - TSH with reflex   - TPO Ab    RTC: Pending results      Judy Freire, DO   Endocrinology

## 2024-01-08 ENCOUNTER — OFFICE VISIT (OUTPATIENT)
Dept: ENDOCRINOLOGY | Facility: CLINIC | Age: 24
End: 2024-01-08
Payer: MEDICAID

## 2024-01-08 VITALS
WEIGHT: 184 LBS | DIASTOLIC BLOOD PRESSURE: 63 MMHG | BODY MASS INDEX: 32.6 KG/M2 | SYSTOLIC BLOOD PRESSURE: 104 MMHG | HEIGHT: 63 IN

## 2024-01-08 DIAGNOSIS — Z13.29 SCREENING FOR THYROID DISORDER: Primary | ICD-10-CM

## 2024-01-08 PROCEDURE — 99203 OFFICE O/P NEW LOW 30 MIN: CPT | Performed by: STUDENT IN AN ORGANIZED HEALTH CARE EDUCATION/TRAINING PROGRAM

## 2024-01-08 PROCEDURE — 1036F TOBACCO NON-USER: CPT | Performed by: STUDENT IN AN ORGANIZED HEALTH CARE EDUCATION/TRAINING PROGRAM

## 2024-01-29 ENCOUNTER — OFFICE VISIT (OUTPATIENT)
Dept: BEHAVIORAL HEALTH | Facility: CLINIC | Age: 24
End: 2024-01-29
Payer: MEDICAID

## 2024-01-29 DIAGNOSIS — Z86.59 PSYCHIATRIC FOLLOW-UP: ICD-10-CM

## 2024-01-29 DIAGNOSIS — Z86.59 HISTORY OF INTELLECTUAL DISABILITY: ICD-10-CM

## 2024-01-29 DIAGNOSIS — Z87.898 HISTORY OF DEVELOPMENTAL DISORDER: ICD-10-CM

## 2024-01-29 DIAGNOSIS — Q93.52 PHELAN-MCDERMID 22Q13 DELETION SYNDROME: ICD-10-CM

## 2024-01-29 DIAGNOSIS — F41.3 OTHER MIXED ANXIETY DISORDERS: Primary | ICD-10-CM

## 2024-01-29 DIAGNOSIS — F42.8 OTHER OBSESSIVE-COMPULSIVE DISORDERS: ICD-10-CM

## 2024-01-29 DIAGNOSIS — F84.0 AUTISM SPECTRUM DISORDER (HHS-HCC): ICD-10-CM

## 2024-01-29 DIAGNOSIS — F90.2 ATTENTION DEFICIT HYPERACTIVITY DISORDER (ADHD), COMBINED TYPE: ICD-10-CM

## 2024-01-29 DIAGNOSIS — G47.9 SLEEP DISORDER: ICD-10-CM

## 2024-01-29 DIAGNOSIS — Z09 PSYCHIATRIC FOLLOW-UP: ICD-10-CM

## 2024-01-29 DIAGNOSIS — F79 INTELLECTUAL DISABILITY: ICD-10-CM

## 2024-01-29 DIAGNOSIS — Z78.9 DIFFICULTY PARTICIPATING IN COMMUNITY SOCIAL LIFE: ICD-10-CM

## 2024-01-29 PROCEDURE — 99214 OFFICE O/P EST MOD 30 MIN: CPT | Performed by: STUDENT IN AN ORGANIZED HEALTH CARE EDUCATION/TRAINING PROGRAM

## 2024-01-29 PROCEDURE — 90833 PSYTX W PT W E/M 30 MIN: CPT | Performed by: STUDENT IN AN ORGANIZED HEALTH CARE EDUCATION/TRAINING PROGRAM

## 2024-01-29 PROCEDURE — 1036F TOBACCO NON-USER: CPT | Performed by: STUDENT IN AN ORGANIZED HEALTH CARE EDUCATION/TRAINING PROGRAM

## 2024-01-29 PROCEDURE — 90785 PSYTX COMPLEX INTERACTIVE: CPT | Performed by: STUDENT IN AN ORGANIZED HEALTH CARE EDUCATION/TRAINING PROGRAM

## 2024-01-29 NOTE — PROGRESS NOTES
PRESENT FOR VISIT  -Mother (Bethany)  -pt      LAST INTERVENTION  Last seen about 2 months ago in November 2023. Reports of excessive sedation after taking Ativan. Mother expressed that she will like to try prn Valium instead. Reports of tossing and turning while sleeping. Discussed the possibility of a sleep study with mother. Mother expressed desire for patient to see endocrinologist given significant family history of thyroid issues.        HISTORY OF PRESENT ILLNESS    -Interval Change  -Mother reports past-month worsening sleep disturbance, now improving with use of prn ativan. Onset around time of death in family.   -Patient had an endocrinology appointment to assess thyroid function. Mother reports that there was no worrisome finding and that they are still awaiting further tests.        -Anxiety  Stable/worse? Seeming anxious and more perseverating in the setting of poor sleep. Difficulty focusing.  -No report of significant restlessness, pacing, or other signs suggesting psychomotor agitation.   -No report of signs/symptoms consistent with separation anxiety or panic attacks.  Recall from prior: Baseline anxiety typically consists of asking questions repeatedly and getting preoccupied with something that is worrisome to her.         -Behavior  Stable. No reports of behavioral outbursts above baseline.  Recall from prior:   Sitting up, and going back to bed as reviewed by video camera. Mother noted that patient wasn't awake. Agitation and destruction of property. Patient is described as having times where she will ignore and/or refuse to follow instructions and engage in desired activity on her own terms instead. Mother explains that patient does what she wants when she wants to. Will not wait patiently like before. Also, will reportedly “do what she needs to” in order to be able to do what she wants, including pushing/shoving and at times hitting others. Once patient has done what she wanted to do, the behavior  terminates on its own and in most cases patient will be her usual self the rest of the time. Overall course of this sort of behavior has varied in presence/frequency. Comes and goes. Can last up to a few days. Typically resolves in less than a week. Occurs in finite episodes rather than all the time during those days. After an incident patient goes back to her usual self the rest of the day. Severity has remained stable since onset. This behavior per mum never gets severe or frequent enough to call our office about challenging behavior. No report of significant self-injurious behavior.      -Sleep  Improving? New report of sleep disturbance different than in the past. Has been going on the past few weeks coinciding with death in the family. Has been improving in the past week. Mother has been giving her Ativan 1 mg as needed for 3 days. In the past, she used to have multiple awakening and used to be easily redirected. Now she goes into her parents' bedroom seeking reassurance. Otherwise, sleep unchanged. Still having tossing and turning and seeming awake while sleeping at times. Of note, mother reports that patient's grandfather had Lewy body dementia.    Recall from prior: Regular sleep schedule, adequate sleep time (at least 8 hours most nights), restful. Sleeps between 12 and 5 am, mother reports 4 to 10 awakenings and then goes back to sleep. Reports of tossing and turning while sleeping. No report of morning sedation.       -Medication  Mom reports use of ativan for the purpose of sleep in the past 3 days and has been effective. No increase in the use of prn medication for behavior.   Endorses medication adherence. No report of excessive sedation, dizziness, tremors, stiffness, drooling, shuffling gait. No report of significant issues with constipation recently.   Recall from prior: increased sedation with Ativan.      -Health  Stable. Patient was seen by the endocrinologist. No specific findings for thyroid  abnormality. To do TPO antibody and TSH with reflex to free T4.  Upcoming PCP appointment in April. Will get labs then.  No report of hospitalizations, ED visits, new/worsening medical issues, or changes in non-psych medication since last visit. Reports being fully covid vaccinated. Rare migraines consistent with baseline.  No report of signs/symptoms suggesting organic etiology of behavior exacerbation aside from sleep disturbance.         REVIEW OF SYMPTOMS  -Depression: negative  -Anxiety: see HPI  -Psychosis: negative  -Kathrine: negative  -Aggression: see HPI  -Self-injury: skin-picking, no worse than baseline  -Sleep: as per HPI  -Appetite: No issues reported. No report of pica. No changes from baseline  -Medical ROS: patient does not endorse difficulty urinating, constipation, seizures, rash, or polydipsia.  *All other systems have been reviewed and are negative for complaint unless otherwise noted above      PSYCHIATRIC HISTORY  No prior hospitalizations  There was a concern for staring spells and possible seizures when patient was a child. Has series of EEGs There were some results that suggested signs of seizures. 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 Last normal EEG was in 2006.      MEDICATION HISTORY  Amitriptyline - worse aggression?  Clomipramine - worse aggression?  MPH stimulants (Metadate) - unknown outcome  Guanfacine - unclear benefit  Vyvanse - helpful initially but recently causing some aggression      MEDICAL HISTORY  PCP: Dr. 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  Behavior: distant, mildly avoidant  Communication: Stereotypic words/phrases.  Motor: No involuntary movements.  MSK: No TD, tics, or tremor appreciated. AIMS 0.   Mood: Euthymic  Affect: Full range. Appropriately reactive; smiles/laughs when watching something funny.  Thought processes: Coherent. [place]  Thought content: Unable to assess due to limited verbal abilities and ID  Perception: Does not appear to be experiencing hallucinations.  Orientation: Responds to her name.  Intellectual ability: Impaired.  Attention/concentration: alert; attentive.  Insight: Poor  Judgment: Poor         RISK ASSESSMENT   Patient felt to be at low acute and imminent risk of harm to self and others based on consideration of her risk and protective factors, as well as evaluation of her current clinical condition. She does not currently meet criteria for inpatient psychiatric hospitalization given that she does not exhibit evidence of significant deterioration in baseline psychiatric illness, aggression, self-injury, and ability to care for self. There is no evidence that patient’s current caregivers/living environment are unable to safely manage patient’s behavior. Overall risk mitigated by continued psychiatric follow-up care, psychopharmacologic intervention, 24/7 supervision, and [facility] Board services. Patient/caregivers are aware of emergency psychiatric resources available in the event of an acute psychiatric emergency, Mobile Crisis, 911, and local ER.        #Psychotherapy provided   -Provided 20 minutes of psychotherapy to patient and/or  family/caregivers    -Psychotherapeutic interventions utilized:   --Supportive, Solutions-focused, Parent/Caregiver Training  -Target issues/Main topics discussed:  --Psychiatric symptoms, behavior, daily life, stressors, family/friends  -Additional therapeutic interventions:   --Non-psychopharmacological Tx strategies were recommended. Family/caregivers provided with education using examples to illustrate and implementation advice offered.   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         AS OF THIS APPOINTMENT:  Mainly psychiatrically/behaviorally stable except for sleep. No report of significant behavioral concerns. New report of sleep disturbance different than in the past. Has been going on the past few weeks coinciding with death in the family. Has been improving in the past week. Mother has been giving her Ativan 1 mg as needed for 3 days. In the past, she used to have multiple awakenings and used to be easily redirected. Now she goes into her parents' bedroom seeking reassurance rather than staying in bed. Otherwise, sleep unchanged. Still having tossing and turning and seeming awake while sleeping at times. Of note, mother reports that patient's grandfather had Lewy body dementia.      Etiology of sleep disturbance at this time unclear. There may be some contribution of environmental factors like death in the family. There is also a possibility that this might be common in individuals with [place]-McDermid syndrome. Will also want to keep into consideration that patient has had some strange behaviors during sleep especially when tossing and turning and seeming startling awake. Will keep under consideration that grandfather had Lewy body dementia.    For the time being, improving with  Ativan. Will continue Ativan as needed for the next week or two and continue to monitor and reassess in 4 weeks.    Had a consult with endocrine with unremarkable workup. Will continue to follow.        Diagnoses:  -Yelena-McDermid syndrome  -Moderate Intellectual disability  -ASD  -OCD  -GRACE  -ADHD      PLAN / MANAGEMENT / RECOMMENDATIONS                 #Visit Complexity  -Visit of high complexity given patient's intellectual/developmental disability and/or impaired communication abilities resulting in need for the presence of a third party to provide clinical information and history.      #Medication Management  -Continue   --Valium 5 mg PRN and another 5 mg if not effective within 45 minutes for agitation  --Lorazepam 1 mg PRN up to twice a day for agitation  --Clonidine PRN 0.1mg bid for anxiety/agitation  ---Take half tablet PRN bid. If minimal response within about 45 minutes, give another half tablet  --Trazodone 200mg QHS  --nortriptyline 50mg QHS for ADHD and migraines  --cyproheptadine 12mg QHS for migraines  --NAC 1000mg BID for OCD/skin picking  --melatonin 5 mg daily      #Medication Considerations  -Medication consent/assent:   Risks, benefits, alternatives, off-label uses, black box warnings, and frequent/important side effects of medications have been discussed with patient/caregiver. To the extent possible, they have voiced understanding and agreement with recommended use of psychotropic medication.     -Medical necessity for continued treatment with psychotropic medication:      [x] Symptom reduction        [x] Improvement in functioning      [x] Reduce risk of harm to self/others      [] Maintenance therapy to prevent deterioration in functioning      -Rational for not reducing medication dose at this time:           [x] Significant risk of deterioration in functioning       [x] Concern for elevating risk of harm to self/others      [] Prior dose reduction unsuccessful and/or harmful      [x]  Psychiatric/behavioral condition not adequately stabilized/optimized       [] Medication regimen recently modified          -OARRS  --I have personally reviewed patient's OARRS report. I have considered the risks of abuse, dependence, addiction, and diversion and feel that the potential benefits of treatment with a controlled substance currently outweigh the potential risks.  --OARRS last checked 11/6/2023      -Risk/Benefit assessment:  There is no report of signs/symptoms consistent with medication-induced impairment in daily functioning. At this time, benefits of medication felt to outweigh potential risks. But we will continue to reassess need for psychotropic medication at regular 3 to 6 month intervals, or sooner as clinically indicated.         #Medication Monitoring Plan:   --Labs to monitor: CBC, CMP, TSH/T4, lipid panel, Hgb A1c, EKG      #Psychotherapy with E/M services provided as documented above         #MDM/Complexity Issues    [] Chronic medical comorbidities     [] Chronic risk of harm to self/others     [x] Intellectual/Developmental disability     [x] Need for independent historian due to intellectual/developmental disability and/or           impaired communication abilities     [x] Controlled substance medication requiring regular monitoring     [] Medication requiring significant ongoing monitoring for toxicity     #Counseling Provided     [x] Diagnostic impression/prognosis      [x] Risks and benefits of treatment options     [x] Instruction for management/treatment and follow-up     [x] Educated patient/caregiver about: behavioral interventions, sleep hygiene, safety planning                  #Coordination of care provided    [x] Family    [] Caregiver/DSP/Staff       []Agency supervisor       [] Nursing staff      [] SSA/          []Therapist                     [] Guardian         #Follow-up      -in 4 weeks, or sooner if new/worsening symptoms         >> Scheduled virtual  Follow-up Appt on 2/26/2024 at 15:00         Time:  Prep time on date of the patient encounter: 5 minutes  Time spent directly with patient/family/caregiver: 45 minutes  Additional time spent on patient care activities: 10 minutes   Documentation time: 10 minutes  Total time on date of patient encounter: 70 minutes        Scribe Attestation  By signing my name below, I, Arely Fountain , Scribe   attest that this documentation has been prepared under the direction and in the presence of Ilana Rodriguez DO.

## 2024-01-31 NOTE — PATIENT INSTRUCTIONS
AFTER VISIT SUMMARY      Date: 1/29/2024  Appointment with Psychiatrist - Dr. Rodriguez      Reason for Visit:  -Routine follow-up/Medication management      Discussed during Appointment:   -Physical health, psychiatric/behavioral symptoms, daily functioning, new issues/concerns     -Treatment plan/management      Clinical Impression/Status Update:  Mainly psychiatrically/behaviorally stable except for sleep. No report of significant behavioral concerns. New report of sleep disturbance. Has been going on the past few weeks coinciding with death in the family. Has been improving in the past week. Mother has been giving her Ativan 1 mg as needed for 3 days.  For the time being, improving with Ativan. Will continue Ativan as needed for the next week or two and continue to monitor and reassess in 4 weeks.        #Clinic Policies/Procedures  -Refills  Prescriptions will be sent to your pharmacy with enough refills to last until your next appointment. For established patients, we typically provide 6 refills for regular meds and 3 refills for controlled substances (e.g., ADHD stimulant medication, benzodiazepines such as lorazepam). We will not provide additional refills beyond that without having an appointment. You can schedule an appointment by calling our office at [phone].     -Paperwork Requests (e.g., Expert Eval for guardianship)  We ask that you please schedule an appointment if needing paperwork filled out by the doctor (e.g., Expert Eval, FMLA form).  Please provide as much information as possible about your request and email the doctor any forms needing to be filled out prior to the appointment.       ===========================  INSTRUCTIONS/RECOMMENDATIONS  ===========================    #Medication   -No medication changes made today  --Continue taking your psych medications the same as usual    --Refills will be sent to your pharmacy      >>For prior authorization issues at the pharmacy -> Call the  office and ask to talk to Nurse Garces.      #Technical Issues with Epic -> Please help us improve the Epic experience  To help identify issues needing to be fixed and improve patient care, please report any issues you experience with Epic or [employer], such as difficulty connecting to video during virtual visits.  [phone]        #Follow-up  --Your next appointment is scheduled for 2/26/2024 at 3:00pm (virtual visit with [employer])    *If having new/worsening symptoms, please call the clinic ([phone]) to discuss being seen sooner

## 2024-02-26 ENCOUNTER — OFFICE VISIT (OUTPATIENT)
Dept: BEHAVIORAL HEALTH | Facility: CLINIC | Age: 24
End: 2024-02-26
Payer: COMMERCIAL

## 2024-02-26 DIAGNOSIS — Z01.89 ASSESSMENT OF EFFECTS OF PSYCHOTROPIC DRUG IN PATIENT AT RISK FOR METABOLIC SYNDROME: ICD-10-CM

## 2024-02-26 DIAGNOSIS — F84.0 AUTISM SPECTRUM DISORDER (HHS-HCC): ICD-10-CM

## 2024-02-26 DIAGNOSIS — F41.1 GAD (GENERALIZED ANXIETY DISORDER): ICD-10-CM

## 2024-02-26 DIAGNOSIS — F79 INTELLECTUAL DISABILITY: ICD-10-CM

## 2024-02-26 DIAGNOSIS — Z79.899 ASSESSMENT OF EFFECTS OF PSYCHOTROPIC DRUG IN PATIENT AT RISK FOR METABOLIC SYNDROME: ICD-10-CM

## 2024-02-26 DIAGNOSIS — F42.8 OTHER OBSESSIVE-COMPULSIVE DISORDERS: ICD-10-CM

## 2024-02-26 DIAGNOSIS — Z91.89 AT RISK FOR SIDE EFFECT OF MEDICATION: ICD-10-CM

## 2024-02-26 DIAGNOSIS — G47.9 SLEEP DISORDER: ICD-10-CM

## 2024-02-26 DIAGNOSIS — Q93.52 PHELAN-MCDERMID 22Q13 DELETION SYNDROME: ICD-10-CM

## 2024-02-26 DIAGNOSIS — Z86.59 PSYCHIATRIC FOLLOW-UP: ICD-10-CM

## 2024-02-26 DIAGNOSIS — Z09 PSYCHIATRIC FOLLOW-UP: ICD-10-CM

## 2024-02-26 PROCEDURE — 99214 OFFICE O/P EST MOD 30 MIN: CPT | Performed by: STUDENT IN AN ORGANIZED HEALTH CARE EDUCATION/TRAINING PROGRAM

## 2024-02-26 PROCEDURE — 90785 PSYTX COMPLEX INTERACTIVE: CPT | Performed by: STUDENT IN AN ORGANIZED HEALTH CARE EDUCATION/TRAINING PROGRAM

## 2024-02-26 PROCEDURE — 1036F TOBACCO NON-USER: CPT | Performed by: STUDENT IN AN ORGANIZED HEALTH CARE EDUCATION/TRAINING PROGRAM

## 2024-02-26 PROCEDURE — 90833 PSYTX W PT W E/M 30 MIN: CPT | Performed by: STUDENT IN AN ORGANIZED HEALTH CARE EDUCATION/TRAINING PROGRAM

## 2024-02-26 NOTE — PATIENT INSTRUCTIONS
AFTER VISIT SUMMARY      Date: 2/26/2024  Appointment with Psychiatrist - Dr. Rodriguez      Reason for Visit:  -Routine follow-up/Medication management      Discussed during Appointment:   -Physical health, psychiatric/behavioral symptoms, daily functioning, new issues/concerns     -Treatment plan/management  -Medication      Clinical Impression/Status Update:  Sleep has reportedly improved since last visit. Patient is noted to still have tossing and turning while in bed but no reports of night time awakenings. Has been weaned off of Ativan at bedtime following improvement in sleep.   Patient had a recent ear infection, per mother. Otherwise, psychiatrically and behaviorally stable. No new issues/concerns reported. Will continue current treatment plan and make no medication changes today. Will order labs and follow up in 6 to 8 weeks.      #Clinic Policies/Procedures  -Refills  Prescriptions will be sent to your pharmacy with enough refills to last until your next appointment. For established patients, we typically provide 6 refills for regular meds and 3 refills for controlled substances (e.g., ADHD stimulant medication, benzodiazepines such as lorazepam). We will not provide additional refills beyond that without having an appointment. You can schedule an appointment by calling our office at [phone].     -Paperwork Requests (e.g., Expert Eval for guardianship)  We ask that you please schedule an appointment if needing paperwork filled out by the doctor (e.g., Expert Eval, FMLA form).  Please provide as much information as possible about your request and email the doctor any forms needing to be filled out prior to the appointment.       ===========================  INSTRUCTIONS/RECOMMENDATIONS  ===========================    #Medication   -No medication changes made today  --Continue taking your psych medications the same as usual    --Refills will be sent to your pharmacy      >>For prior authorization  issues at the pharmacy -> Call the office and ask to talk to Nurse Garces.      #To-Do prior to Next Visit  >> You are due for your annual bloodwork.   >> Please get bloodwork/labs done at least one week prior to your next appointment.    -Bloodwork is necessary to help us identify potential medication side effects and monitor your overall health.   -No appointment or paperwork necessary. But you must be fasting for 12 hours prior to getting your blood drawn.   -Find nearest  lab hours and location here: [email or web address]      #Technical Issues with Epic -> Please help us improve the Epic experience  To help identify issues needing to be fixed and improve patient care, please report any issues you experience with Epic or  [employer], such as difficulty connecting to video during virtual visits.  [phone]      #Follow-up  --Your next appointment is scheduled for 4/16/2024 at 3:00pm (virtual visit with [employer])    *If having new/worsening symptoms, please call the clinic ([phone]) to discuss being seen sooner   >>If unable to reach the office, send me an email at [email or web address]

## 2024-02-26 NOTE — PROGRESS NOTES
OTHERS ATTENDING APPOINTMENT:  -Mother  *Presence necessary as independent historian due to patient's intellectual/developmental disability and/or impaired communication abilities      LAST INTERVENTION  Last seen about 1 month ago. Continued reports of sleep disturbance which was improving with Ativan. No medication changes made.      HISTORY OF PRESENT ILLNESS    -Interval Change  -Patient was at urgent care for ear infection.  -Otherwise, no new issues/concerns.    -Anxiety  Stable.  -No report of significant restlessness, pacing, or other signs suggesting psychomotor agitation.   -No report of signs/symptoms consistent with separation anxiety or panic attacks.  Recall from prior: Seeming anxious and more perseverating in the setting of poor sleep. Difficulty focusing. Baseline anxiety typically consists of asking questions repeatedly and getting preoccupied with something that is worrisome to her.         -Behavior  Stable. No reports of behavioral outbursts above baseline.  Recall from prior:   Sitting up, and going back to bed as reviewed by video camera. Mother noted that patient wasn't awake. Agitation and destruction of property. Patient is described as having times where she will ignore and/or refuse to follow instructions and engage in desired activity on her own terms instead. Mother explains that patient does what she wants when she wants to. Will not wait patiently like before. Also, will reportedly “do what she needs to” in order to be able to do what she wants, including pushing/shoving and at times hitting others. Once patient has done what she wanted to do, the behavior terminates on its own and in most cases patient will be her usual self the rest of the time. Overall course of this sort of behavior has varied in presence/frequency. Comes and goes. Can last up to a few days. Typically resolves in less than a week. Occurs in finite episodes rather than all the time during those days. After an  incident patient goes back to her usual self the rest of the day. Severity has remained stable since onset. This behavior per mum never gets severe or frequent enough to call our office about challenging behavior. No report of significant self-injurious behavior.      -Sleep  Improved. Still tosses and turns but no more night time awakenings.  Recall from prior: Report of sleep disturbance coinciding with death in the family. Patient reportedly used to have multiple awakening and used to be easily redirected. With recent disturbance, patient goes into her parents' bedroom seeking reassurance.   *Of note, mother reports that patient's grandfather had Lewy body dementia.    Recall from prior: Regular sleep schedule, adequate sleep time (at least 8 hours most nights), restful. Sleeps between 12 and 5 am, mother reports 4 to 10 awakenings and then goes back to sleep. Reports of tossing and turning while sleeping. No report of morning sedation.       -Medication  Weaned off of bedtime Ativan for sleep and is currently on Clonidine only.  Endorses medication adherence. No report of excessive sedation, dizziness, tremors, stiffness, drooling, shuffling gait. No report of significant issues with constipation recently.   Recall from prior: increased sedation with Ativan.      -Health  Was in urgent care for ear infection. Patient is reportedly improving.  No report of hospitalizations, ED visits, new/worsening medical issues, or changes in non-psych medication since last visit. Reports being fully covid vaccinated. Rare migraines consistent with baseline.  No report of signs/symptoms suggesting organic etiology of behavior exacerbation aside from sleep disturbance.         REVIEW OF SYMPTOMS  -Depression: negative  -Anxiety: see HPI  -Psychosis: negative  -Kathrine: negative  -Aggression: see HPI  -Self-injury: skin-picking, no worse than baseline  -Sleep: as per HPI  -Appetite: No issues reported. No report of pica. No changes  from baseline  -Medical ROS: patient does not endorse difficulty urinating, constipation, seizures, rash, or polydipsia.  *All other systems have been reviewed and are negative for complaint unless otherwise noted above      PSYCHIATRIC HISTORY  No prior hospitalizations  There was a concern for staring spells and possible seizures when patient was a child. Has series of EEGs There were some results that suggested signs of seizures. 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 Last normal EEG was in 2006.      MEDICATION HISTORY  Amitriptyline - worse aggression?  Clomipramine - worse aggression?  MPH stimulants (Metadate) - unknown outcome  Guanfacine - unclear benefit  Vyvanse - helpful initially but recently causing some aggression      MEDICAL HISTORY  PCP: Dr. 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  Behavior: distant, mildly avoidant  Communication: Stereotypic words/phrases.  Motor: No involuntary movements.  MSK: No TD, tics, or tremor appreciated. AIMS 0.   Mood: Euthymic  Affect: Full range. Appropriately reactive; smiles/laughs when watching something funny.  Thought processes: Coherent. Coleman  Thought content:  Unable to assess due to limited verbal abilities and ID  Perception: Does not appear to be experiencing hallucinations.  Orientation: Responds to her name.  Intellectual ability: Impaired.  Attention/concentration: alert; attentive.  Insight: Poor  Judgment: Poor         RISK ASSESSMENT   Patient felt to be at low acute and imminent risk of harm to self and others based on consideration of her risk and protective factors, as well as evaluation of her current clinical condition. She does not currently meet criteria for inpatient psychiatric hospitalization given that she does not exhibit evidence of significant deterioration in baseline psychiatric illness, aggression, self-injury, and ability to care for self. There is no evidence that patient’s current caregivers/living environment are unable to safely manage patient’s behavior. Overall risk mitigated by continued psychiatric follow-up care, psychopharmacologic intervention, 24/7 supervision, and [facility] services. Patient/caregivers are aware of emergency psychiatric resources available in the event of an acute psychiatric emergency, Mobile Crisis, 911, and local ER.        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       ###  AS OF THIS APPOINTMENT:  Overall, appears psychiatrically and behaviorally close to baseline. Sleep improved since last visit. No longer having nighttime  awakenings and was able to be weaned off of bedtime Ativan. No new  issues/concerns reported. Will continue current treatment plan and make no medication changes today. Due for yearly monitoring labs so will order that and plan for  follow up in 6 to 8 weeks.  ###      Diagnoses:  -Yelena-McDermid syndrome  -Intellectual disability  -ASD  -OCD  -GRACE  -ADHD      PLAN/MANAGEMENT/RECOMMENDATIONS                 #Visit Complexity  -Visit of high complexity given patient's intellectual/developmental disability and/or impaired communication abilities resulting in need for the presence of a third party to provide clinical information and history.      #Medication Management  -Continue   --Valium 5 mg PRN and another 5 mg if not effective within 45 minutes for agitation  --Lorazepam 1 mg PRN up to twice a day for agitation  --Clonidine PRN 0.1mg bid for anxiety/agitation  ---Take half tablet PRN bid. If minimal response within about 45 minutes, give another half tablet; Mother administers this at 7:00, 9:00, 12:00, and 17:00  --Trazodone 200mg QHS  --nortriptyline 50mg QHS for ADHD and migraines  --cyproheptadine 12mg QHS for migraines  --NAC 1000mg BID for OCD/skin picking  --melatonin 5 mg daily      #Medication Considerations  -Medication consent/assent:   Risks, benefits, alternatives, off-label uses, black box warnings, and frequent/important side effects of medications have been discussed with patient/caregiver. To the extent possible, they have voiced understanding and agreement with recommended use of psychotropic medication.     -Medical necessity for continued treatment with psychotropic medication:      [] Symptom reduction        [] Improvement in functioning      [x] Reduce risk of harm to self/others      [x] Maintenance therapy to prevent deterioration in functioning      -Rational for not reducing medication dose at this time:           [x] Significant risk of deterioration in functioning       [x] Concern for elevating risk of harm to self/others      [] Prior dose  reduction unsuccessful and/or harmful      [] Psychiatric/behavioral condition not adequately stabilized/optimized       [x] Medication regimen recently modified          -OARRS  --I have personally reviewed patient's OARRS report. I have considered the risks of abuse, dependence, addiction, and diversion and feel that the potential benefits of treatment with a controlled substance currently outweigh the potential risks.        -Risk/Benefit assessment:  There is no report of signs/symptoms consistent with medication-induced impairment in daily functioning. At this time, benefits of medication felt to outweigh potential risks. But we will continue to reassess need for psychotropic medication at regular 3 to 6 month intervals, or sooner as clinically indicated.         #Medication Monitoring Plan:   -Labs next due: April 2024  --Labs to monitor: CBC, CMP, TSH/T4, lipid panel, Hgb A1c, EKG      #Psychotherapy with E/M services provided as documented above       #MDM/Complexity Issues    [] Chronic medical comorbidities     [x] Chronic risk of harm to self/others     [x] Intellectual/Developmental disability     [x] Need for independent historian due to intellectual/developmental disability and/or           impaired communication abilities     [x] Controlled substance medication requiring regular monitoring     [] Medication requiring significant ongoing monitoring for toxicity     #Counseling Provided     [x] Diagnostic impression/prognosis      [x] Risks and benefits of treatment options     [x] Instruction for management/treatment and follow-up     [x] Educated patient/caregiver about: behavioral interventions, sleep hygiene, safety planning                  #Coordination of care provided    [x] Family    [] Caregiver/DSP/Staff       [] Agency supervisor       [] Nursing staff      [] SSA/          [] Therapist                     [] Guardian         #Follow-up      -in 6 to 8 weeks, or sooner if  new/worsening symptoms         >> Scheduled virtual Follow-up Appt on 4/16/2024 at 15:00         Time:  Prep time on date of the patient encounter: 5 minutes  Time spent directly with patient/family/caregiver: 45 minutes  Additional time spent on patient care activities: 10 minutes   Documentation time: 10 minutes  Total time on date of patient encounter: 70 minutes        Scribe Attestation  By signing my name below, I, Arely Fountain , Ozzie   attest that this documentation has been prepared under the direction and in the presence of Ilana Rodriguez DO.

## 2024-03-05 ENCOUNTER — TELEPHONE (OUTPATIENT)
Dept: BEHAVIORAL HEALTH | Facility: CLINIC | Age: 24
End: 2024-03-05
Payer: COMMERCIAL

## 2024-03-05 RX ORDER — CYPROHEPTADINE HYDROCHLORIDE 4 MG/1
12 TABLET ORAL NIGHTLY
Qty: 30 TABLET | Status: CANCELLED | OUTPATIENT
Start: 2024-03-05

## 2024-03-07 DIAGNOSIS — G47.9 SLEEP DISORDER: ICD-10-CM

## 2024-03-07 DIAGNOSIS — F42.8 OTHER OBSESSIVE-COMPULSIVE DISORDER: ICD-10-CM

## 2024-03-07 DIAGNOSIS — G43.909 MIGRAINE WITHOUT STATUS MIGRAINOSUS, NOT INTRACTABLE, UNSPECIFIED MIGRAINE TYPE: ICD-10-CM

## 2024-03-07 DIAGNOSIS — Z00.00 HEALTHCARE MAINTENANCE: ICD-10-CM

## 2024-03-07 DIAGNOSIS — Z30.9 ENCOUNTER FOR CONTRACEPTIVE MANAGEMENT, UNSPECIFIED TYPE: ICD-10-CM

## 2024-03-07 DIAGNOSIS — F41.3 OTHER MIXED ANXIETY DISORDERS: ICD-10-CM

## 2024-03-07 DIAGNOSIS — F84.0 AUTISM SPECTRUM DISORDER (HHS-HCC): ICD-10-CM

## 2024-03-07 DIAGNOSIS — F79 INTELLECTUAL DISABILITY: ICD-10-CM

## 2024-03-07 DIAGNOSIS — Q93.52 PHELAN-MCDERMID 22Q13 DELETION SYNDROME: ICD-10-CM

## 2024-03-07 PROBLEM — F41.1 GAD (GENERALIZED ANXIETY DISORDER): Status: ACTIVE | Noted: 2023-10-25

## 2024-03-07 RX ORDER — TRAZODONE HYDROCHLORIDE 100 MG/1
TABLET ORAL
Qty: 60 TABLET | Refills: 6 | Status: SHIPPED | OUTPATIENT
Start: 2024-03-07

## 2024-03-07 RX ORDER — LEVONORGESTREL AND ETHINYL ESTRADIOL 0.15-0.03
1 KIT ORAL DAILY
Qty: 91 TABLET | Refills: 3 | Status: SHIPPED | OUTPATIENT
Start: 2024-03-07 | End: 2024-04-09 | Stop reason: SDUPTHER

## 2024-03-07 RX ORDER — CLONIDINE HYDROCHLORIDE 0.1 MG/1
TABLET ORAL
Qty: 90 TABLET | Refills: 6 | Status: SHIPPED | OUTPATIENT
Start: 2024-03-07

## 2024-03-07 RX ORDER — CYPROHEPTADINE HYDROCHLORIDE 4 MG/1
TABLET ORAL
Qty: 90 TABLET | Refills: 6 | Status: SHIPPED | OUTPATIENT
Start: 2024-03-07

## 2024-04-02 ENCOUNTER — OFFICE VISIT (OUTPATIENT)
Dept: BEHAVIORAL HEALTH | Facility: CLINIC | Age: 24
End: 2024-04-02
Payer: COMMERCIAL

## 2024-04-02 DIAGNOSIS — Z01.89 ASSESSMENT OF EFFECTS OF PSYCHOTROPIC DRUG IN PATIENT AT RISK FOR METABOLIC SYNDROME: ICD-10-CM

## 2024-04-02 DIAGNOSIS — Q93.52 PHELAN-MCDERMID 22Q13 DELETION SYNDROME: ICD-10-CM

## 2024-04-02 DIAGNOSIS — F42.8 OTHER OBSESSIVE-COMPULSIVE DISORDERS: ICD-10-CM

## 2024-04-02 DIAGNOSIS — Z79.899 ASSESSMENT OF EFFECTS OF PSYCHOTROPIC DRUG IN PATIENT AT RISK FOR METABOLIC SYNDROME: ICD-10-CM

## 2024-04-02 DIAGNOSIS — Z86.59 PSYCHIATRIC FOLLOW-UP: ICD-10-CM

## 2024-04-02 DIAGNOSIS — F84.0 AUTISM SPECTRUM DISORDER (HHS-HCC): ICD-10-CM

## 2024-04-02 DIAGNOSIS — F79 INTELLECTUAL DISABILITY: ICD-10-CM

## 2024-04-02 DIAGNOSIS — F90.2 ATTENTION DEFICIT HYPERACTIVITY DISORDER (ADHD), COMBINED TYPE: ICD-10-CM

## 2024-04-02 DIAGNOSIS — Z91.89 AT RISK FOR SIDE EFFECT OF MEDICATION: ICD-10-CM

## 2024-04-02 DIAGNOSIS — F41.1 GAD (GENERALIZED ANXIETY DISORDER): ICD-10-CM

## 2024-04-02 DIAGNOSIS — Z09 PSYCHIATRIC FOLLOW-UP: ICD-10-CM

## 2024-04-02 DIAGNOSIS — G47.9 SLEEP DISORDER: ICD-10-CM

## 2024-04-02 PROCEDURE — 1036F TOBACCO NON-USER: CPT | Performed by: STUDENT IN AN ORGANIZED HEALTH CARE EDUCATION/TRAINING PROGRAM

## 2024-04-02 PROCEDURE — 99214 OFFICE O/P EST MOD 30 MIN: CPT | Performed by: STUDENT IN AN ORGANIZED HEALTH CARE EDUCATION/TRAINING PROGRAM

## 2024-04-02 NOTE — PATIENT INSTRUCTIONS
AFTER VISIT SUMMARY      Date: 4/2/2024  Appointment with Psychiatrist - Dr. Rodriguez      Reason for Visit:  -Routine follow-up/Medication management      Discussed during Appointment:   -Physical health, psychiatric/behavioral symptoms, daily functioning, new issues/concerns     -Treatment plan/management, yearly bloodwork  -Medication      Clinical Impression/Status Update:  Overall, appears psychiatrically and behaviorally stable. Sleep has improved and no longer having night time awakenings. No new issues/concerns reported. Will continue current treatment plan and make no medication changes today.       #Clinic Policies/Procedures  -Refills  Prescriptions will be sent to your pharmacy with enough refills to last until your next appointment. For established patients, we typically provide 6 refills for regular meds and 3 refills for controlled substances (e.g., ADHD stimulant medication, benzodiazepines such as lorazepam). We will not provide additional refills beyond that without having an appointment. You can schedule an appointment by calling our office at [phone].     -Paperwork Requests (e.g., Expert Eval for guardianship)  We ask that you please schedule an appointment if needing paperwork filled out by the doctor (e.g., Expert Eval, FMLA form).  Please provide as much information as possible about your request and email the doctor any forms needing to be filled out prior to the appointment.       ===========================  INSTRUCTIONS/RECOMMENDATIONS  ===========================    #Medication   -No medication changes made today  --Continue taking your psych medications the same as usual    --Refills will be sent to your pharmacy    >>For prior authorization issues at the pharmacy -> Call the office and ask to talk to Nurse Garces.      #Technical Issues with Epic -> Please help us improve the Epic experience  To help identify issues needing to be fixed and improve patient care, please report any  issues you experience with Epic or  [employer], such as difficulty connecting to video during virtual visits.  [phone]      #Follow-up  --Your next appointment is scheduled for 7/01/2024 at 3:00pm (virtual visit with [employer])    *If having new/worsening symptoms, please call the clinic ([phone]) to discuss being seen sooner   >>If unable to reach the office, send me an email at [email or web address]

## 2024-04-02 NOTE — PROGRESS NOTES
OTHERS ATTENDING APPOINTMENT:  -Mother  *Presence necessary as independent historian given patient's intellectual/developmental disability and/or impaired communication abilities      LAST INTERVENTION  Last seen about 1 month ago. Report of improved sleep amidst continuous tossing and turning and successful weaning off of bedtime Ativan. No medication changes.      HISTORY OF PRESENT ILLNESS    #Interval Change  -Patient reported to be at about their psychiatric/behavioral baseline  -No report of new issues/concerns        -Anxiety  Stable.  -No report of significant restlessness, pacing, or other signs suggesting psychomotor agitation.   -No report of signs/symptoms consistent with separation anxiety or panic attacks.  Recall from prior: Seeming anxious and more perseverating in the setting of poor sleep. Difficulty focusing. Baseline anxiety typically consists of asking questions repeatedly and getting preoccupied with something that is worrisome to her.         -Behavior  Stable. Mother reports that patient's behavior is manageable during the day. No reports of behavioral outbursts above baseline.  Recall from prior:   Sitting up, and going back to bed as reviewed by video camera. Mother noted that patient wasn't awake. Agitation and destruction of property. Patient is described as having times where she will ignore and/or refuse to follow instructions and engage in desired activity on her own terms instead. Mother explains that patient does what she wants when she wants to. Will not wait patiently like before. Also, will reportedly “do what she needs to” in order to be able to do what she wants, including pushing/shoving and at times hitting others. Once patient has done what she wanted to do, the behavior terminates on its own and in most cases patient will be her usual self the rest of the time. Overall course of this sort of behavior has varied in presence/frequency. Comes and goes. Can last up to a few  days. Typically resolves in less than a week. Occurs in finite episodes rather than all the time during those days. After an incident patient goes back to her usual self the rest of the day. Severity has remained stable since onset. This behavior per mum never gets severe or frequent enough to call our office about challenging behavior. No report of significant self-injurious behavior.      -Sleep  Stable. No report of sleep disruption. Patient still rolls while on her bed but no more sitting up except when she wakes up at 5 am to use the bathroom.   Recall from prior: Report of sleep disturbance coinciding with death in the family. Patient reportedly used to have multiple awakening and used to be easily redirected. With recent disturbance, patient goes into her parents' bedroom seeking reassurance.   *Of note, mother reports that patient's grandfather had Lewy body dementia.    Recall from prior: Regular sleep schedule, adequate sleep time (at least 8 hours most nights), restful. Sleeps between 12 and 5 am, mother reports 4 to 10 awakenings and then goes back to sleep. Reports of tossing and turning while sleeping. No report of morning sedation.       -Medication  Endorses medication adherence. No report of excessive sedation, dizziness, tremors, stiffness, drooling, shuffling gait. No report of significant issues with constipation recently.   -No report of significant change in frequency of use of PRNs for behaviors/agitation. Needing extra half clonidine prn 1 to 2 times a week.   Recall from prior: increased sedation with Ativan.      -Health  Stable. Had a dentist's appointment last week. Upcoming PCP appointment next month.  No report of hospitalizations, ED visits, new/worsening medical issues, or changes in non-psych medication since last visit. Reports being fully covid vaccinated. Rare migraines consistent with baseline.  No report of signs/symptoms suggesting organic etiology of behavior exacerbation  aside from sleep disturbance.         REVIEW OF SYMPTOMS  -Depression: negative  -Anxiety: see HPI  -Psychosis: negative  -Kathrine: negative  -Aggression: see HPI  -Self-injury: skin-picking, no worse than baseline  -Sleep: as per HPI  -Appetite: No issues reported. No report of pica. No changes from baseline  -Medical ROS: patient does not endorse difficulty urinating, constipation, seizures, rash, or polydipsia.  *All other systems have been reviewed and are negative for complaint unless otherwise noted above      PSYCHIATRIC HISTORY  No prior hospitalizations  There was a concern for staring spells and possible seizures when patient was a child. Has series of EEGs There were some results that suggested signs of seizures. 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 Last normal EEG was in 2006.      MEDICATION HISTORY  Amitriptyline - worse aggression?  Clomipramine - worse aggression?  MPH stimulants (Metadate) - unknown outcome  Guanfacine - unclear benefit  Vyvanse - helpful initially but recently causing some aggression      MEDICAL HISTORY  PCP: Dr. 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  Behavior: distant, mildly  avoidant  Communication: Stereotypic words/phrases.  Motor: No involuntary movements.  MSK: No TD, tics, or tremor appreciated. AIMS 0.   Mood: Euthymic  Affect: Full range. Appropriately reactive; smiles/laughs when watching something funny.  Thought processes: Coherent. [place]  Thought content: Unable to assess due to limited verbal abilities and ID  Perception: Does not appear to be experiencing hallucinations.  Orientation: Responds to her name.  Intellectual ability: Impaired.  Attention/concentration: alert; attentive.  Insight: Poor  Judgment: Poor         RISK ASSESSMENT   Patient felt to be at low acute and imminent risk of harm to self and others based on consideration of her risk and protective factors, as well as evaluation of her current clinical condition. She does not currently meet criteria for inpatient psychiatric hospitalization given that she does not exhibit evidence of significant deterioration in baseline psychiatric illness, aggression, self-injury, and ability to care for self. There is no evidence that patient’s current caregivers/living environment are unable to safely manage patient’s behavior. Overall risk mitigated by continued psychiatric follow-up care, psychopharmacologic intervention, 24/7 supervision, and [facility] services. Patient/caregivers are aware of emergency psychiatric resources available in the event of an acute psychiatric emergency, Mobile Crisis, 911, and local ER.        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       ###  AS OF THIS APPOINTMENT:  Overall, appears psychiatrically and behaviorally stable. Sleep has improved and no longer having night time awakenings. No new issues/concerns reported. Will continue current treatment plan and make no medication changes today. Follow up in 3 months.  ###      Diagnoses:  -Robinson-McDermid syndrome  -Intellectual disability  -ASD  -OCD  -GRACE  -ADHD      PLAN/MANAGEMENT/RECOMMENDATIONS                 #Visit Complexity  -Visit of high complexity given patient's intellectual/developmental disability and/or impaired communication abilities resulting in need for the presence of a third party (mother) to provide clinical information and history.      #Medication Management  -Continue   --Valium 5 mg PRN and another 5 mg if not effective within 45 minutes for agitation  --Lorazepam 1 mg PRN up to twice a day for agitation  --Clonidine PRN 0.1mg bid for anxiety/agitation  ---Take half tablet PRN bid. If minimal response within about 45 minutes, give another half tablet; Mother administers this at 7:00, 9:00, 12:00, and 17:00  --Trazodone 200mg QHS  --nortriptyline 50mg QHS for ADHD and migraines  --cyproheptadine 12mg QHS for migraines  --NAC 1000mg BID for OCD/skin picking  --melatonin 5 mg daily      #Medication Considerations  -Medication consent/assent:   Risks, benefits, alternatives, off-label uses, black box warnings, and frequent/important side effects of medications have been discussed with patient/caregiver. To the extent possible, they have voiced understanding and agreement with recommended use of psychotropic medication.     -Medical necessity for continued treatment with psychotropic medication:      [] Symptom reduction        [] Improvement in functioning      [x] Reduce risk of harm to self/others      [x] Maintenance therapy to prevent deterioration in functioning      -Rational for not reducing medication dose  at this time:           [x] Significant risk of deterioration in functioning       [x] Concern for elevating risk of harm to self/others      [] Prior dose reduction unsuccessful and/or harmful      [] Psychiatric/behavioral condition not adequately stabilized/optimized       [x] Medication regimen recently modified          -OARRS  --I have personally reviewed patient's OARRS report. I have considered the risks of abuse, dependence, addiction, and diversion and feel that the potential benefits of treatment with a controlled substance currently outweigh the potential risks.        -Risk/Benefit assessment:  There is no report of signs/symptoms consistent with medication-induced impairment in daily functioning. At this time, benefits of medication felt to outweigh potential risks. But we will continue to reassess need for psychotropic medication at regular 3 to 6 month intervals, or sooner as clinically indicated.         #Medication Monitoring Plan:   -Labs next due: April 2024  --Labs to monitor: CBC, CMP, TSH/T4, lipid panel, Hgb A1c, EKG      #Psychotherapy with E/M services provided as documented above       #MDM/Complexity Issues    [] Chronic medical comorbidities     [x] Chronic risk of harm to self/others     [x] Intellectual/Developmental disability     [x] Need for independent historian due to intellectual/developmental disability and/or           impaired communication abilities     [x] Controlled substance medication requiring regular monitoring     [] Medication requiring significant ongoing monitoring for toxicity     #Counseling Provided     [x] Diagnostic impression/prognosis      [x] Risks and benefits of treatment options     [x] Instruction for management/treatment and follow-up     [x] Educated patient/caregiver about: behavioral interventions, sleep hygiene, safety planning                  #Coordination of care provided    [x] Family    [] Caregiver/DSP/Staff       [] Agency supervisor       []  Nursing staff      [] SSA/          [] Therapist                     [] Guardian         #Follow-up      -in 3 months, or sooner if new/worsening symptoms         >> Scheduled virtual Follow-up Appt on 7/1/2024 at 15:00         Time:  Prep time on date of the patient encounter: 5 minutes  Time spent directly with patient/family/caregiver: 45 minutes  Additional time spent on patient care activities: 10 minutes   Documentation time: 10 minutes  Total time on date of patient encounter: 70 minutes        Scribe Attestation  By signing my name below, I, Arely Fountain , Scribe   attest that this documentation has been prepared under the direction and in the presence of Ilana Rodriguez DO.

## 2024-04-08 PROBLEM — Z09 PSYCHIATRIC FOLLOW-UP: Status: ACTIVE | Noted: 2024-04-08

## 2024-04-08 PROBLEM — Z01.89 ASSESSMENT OF EFFECTS OF PSYCHOTROPIC DRUG IN PATIENT AT RISK FOR METABOLIC SYNDROME: Status: ACTIVE | Noted: 2024-04-08

## 2024-04-08 PROBLEM — Z86.59 PSYCHIATRIC FOLLOW-UP: Status: ACTIVE | Noted: 2024-04-08

## 2024-04-08 PROBLEM — Z79.899 ASSESSMENT OF EFFECTS OF PSYCHOTROPIC DRUG IN PATIENT AT RISK FOR METABOLIC SYNDROME: Status: ACTIVE | Noted: 2024-04-08

## 2024-04-08 PROBLEM — Z91.89 AT RISK FOR SIDE EFFECT OF MEDICATION: Status: ACTIVE | Noted: 2024-04-08

## 2024-04-09 ENCOUNTER — LAB (OUTPATIENT)
Dept: LAB | Facility: LAB | Age: 24
End: 2024-04-09
Payer: COMMERCIAL

## 2024-04-09 ENCOUNTER — OFFICE VISIT (OUTPATIENT)
Dept: PRIMARY CARE | Facility: CLINIC | Age: 24
End: 2024-04-09
Payer: COMMERCIAL

## 2024-04-09 VITALS
TEMPERATURE: 97.5 F | SYSTOLIC BLOOD PRESSURE: 115 MMHG | DIASTOLIC BLOOD PRESSURE: 82 MMHG | OXYGEN SATURATION: 96 % | WEIGHT: 189 LBS | HEIGHT: 63 IN | BODY MASS INDEX: 33.49 KG/M2 | HEART RATE: 99 BPM

## 2024-04-09 DIAGNOSIS — Q93.52 PHELAN-MCDERMID 22Q13 DELETION SYNDROME: ICD-10-CM

## 2024-04-09 DIAGNOSIS — Z13.29 SCREENING FOR THYROID DISORDER: ICD-10-CM

## 2024-04-09 DIAGNOSIS — Z79.899 ASSESSMENT OF EFFECTS OF PSYCHOTROPIC DRUG IN PATIENT AT RISK FOR METABOLIC SYNDROME: ICD-10-CM

## 2024-04-09 DIAGNOSIS — Z30.9 ENCOUNTER FOR CONTRACEPTIVE MANAGEMENT, UNSPECIFIED TYPE: ICD-10-CM

## 2024-04-09 DIAGNOSIS — G90.1 DYSAUTONOMIA (MULTI): ICD-10-CM

## 2024-04-09 DIAGNOSIS — Z91.89 AT RISK FOR SIDE EFFECT OF MEDICATION: ICD-10-CM

## 2024-04-09 DIAGNOSIS — Z01.89 ASSESSMENT OF EFFECTS OF PSYCHOTROPIC DRUG IN PATIENT AT RISK FOR METABOLIC SYNDROME: ICD-10-CM

## 2024-04-09 DIAGNOSIS — Z00.00 HEALTHCARE MAINTENANCE: ICD-10-CM

## 2024-04-09 DIAGNOSIS — Z00.00 MEDICARE ANNUAL WELLNESS VISIT, SUBSEQUENT: Primary | ICD-10-CM

## 2024-04-09 DIAGNOSIS — I42.9 CARDIOMYOPATHY, UNSPECIFIED TYPE (MULTI): ICD-10-CM

## 2024-04-09 DIAGNOSIS — E88.40 MITOCHONDRIAL DISEASE (MULTI): ICD-10-CM

## 2024-04-09 LAB
ALBUMIN SERPL BCP-MCNC: 4.1 G/DL (ref 3.4–5)
ALP SERPL-CCNC: 54 U/L (ref 33–110)
ALT SERPL W P-5'-P-CCNC: 21 U/L (ref 7–45)
ANION GAP SERPL CALC-SCNC: 9 MMOL/L (ref 10–20)
AST SERPL W P-5'-P-CCNC: 16 U/L (ref 9–39)
BILIRUB SERPL-MCNC: 0.4 MG/DL (ref 0–1.2)
BUN SERPL-MCNC: 11 MG/DL (ref 6–23)
CALCIUM SERPL-MCNC: 9.6 MG/DL (ref 8.6–10.6)
CHLORIDE SERPL-SCNC: 103 MMOL/L (ref 98–107)
CHOLEST SERPL-MCNC: 183 MG/DL (ref 0–199)
CHOLESTEROL/HDL RATIO: 4.3
CO2 SERPL-SCNC: 30 MMOL/L (ref 21–32)
CREAT SERPL-MCNC: 0.74 MG/DL (ref 0.5–1.05)
EGFRCR SERPLBLD CKD-EPI 2021: >90 ML/MIN/1.73M*2
ERYTHROCYTE [DISTWIDTH] IN BLOOD BY AUTOMATED COUNT: 12.1 % (ref 11.5–14.5)
EST. AVERAGE GLUCOSE BLD GHB EST-MCNC: 88 MG/DL
GLUCOSE SERPL-MCNC: 91 MG/DL (ref 74–99)
HBA1C MFR BLD: 4.7 %
HCT VFR BLD AUTO: 39.8 % (ref 36–46)
HDLC SERPL-MCNC: 42.9 MG/DL
HGB BLD-MCNC: 13.3 G/DL (ref 12–16)
LDLC SERPL CALC-MCNC: 103 MG/DL
MAGNESIUM SERPL-MCNC: 2.14 MG/DL (ref 1.6–2.4)
MCH RBC QN AUTO: 30.6 PG (ref 26–34)
MCHC RBC AUTO-ENTMCNC: 33.4 G/DL (ref 32–36)
MCV RBC AUTO: 92 FL (ref 80–100)
NON HDL CHOLESTEROL: 140 MG/DL (ref 0–149)
NRBC BLD-RTO: 0 /100 WBCS (ref 0–0)
PLATELET # BLD AUTO: 288 X10*3/UL (ref 150–450)
POTASSIUM SERPL-SCNC: 4.3 MMOL/L (ref 3.5–5.3)
PROT SERPL-MCNC: 7.1 G/DL (ref 6.4–8.2)
RBC # BLD AUTO: 4.34 X10*6/UL (ref 4–5.2)
SODIUM SERPL-SCNC: 138 MMOL/L (ref 136–145)
T4 FREE SERPL-MCNC: 1.09 NG/DL (ref 0.78–1.48)
THYROPEROXIDASE AB SERPL-ACNC: 37 IU/ML
TRIGL SERPL-MCNC: 188 MG/DL (ref 0–149)
TSH SERPL-ACNC: 0.96 MIU/L (ref 0.44–3.98)
VLDL: 38 MG/DL (ref 0–40)
WBC # BLD AUTO: 6.5 X10*3/UL (ref 4.4–11.3)

## 2024-04-09 PROCEDURE — 84443 ASSAY THYROID STIM HORMONE: CPT

## 2024-04-09 PROCEDURE — 36415 COLL VENOUS BLD VENIPUNCTURE: CPT

## 2024-04-09 PROCEDURE — 83036 HEMOGLOBIN GLYCOSYLATED A1C: CPT

## 2024-04-09 PROCEDURE — 86376 MICROSOMAL ANTIBODY EACH: CPT

## 2024-04-09 PROCEDURE — 80053 COMPREHEN METABOLIC PANEL: CPT

## 2024-04-09 PROCEDURE — 99395 PREV VISIT EST AGE 18-39: CPT | Performed by: STUDENT IN AN ORGANIZED HEALTH CARE EDUCATION/TRAINING PROGRAM

## 2024-04-09 PROCEDURE — G0439 PPPS, SUBSEQ VISIT: HCPCS | Performed by: STUDENT IN AN ORGANIZED HEALTH CARE EDUCATION/TRAINING PROGRAM

## 2024-04-09 PROCEDURE — 84439 ASSAY OF FREE THYROXINE: CPT

## 2024-04-09 PROCEDURE — 1036F TOBACCO NON-USER: CPT | Performed by: STUDENT IN AN ORGANIZED HEALTH CARE EDUCATION/TRAINING PROGRAM

## 2024-04-09 PROCEDURE — 80061 LIPID PANEL: CPT

## 2024-04-09 PROCEDURE — 85027 COMPLETE CBC AUTOMATED: CPT

## 2024-04-09 PROCEDURE — 83735 ASSAY OF MAGNESIUM: CPT

## 2024-04-09 RX ORDER — LEVONORGESTREL AND ETHINYL ESTRADIOL 0.15-0.03
1 KIT ORAL DAILY
Qty: 91 TABLET | Refills: 3 | Status: SHIPPED | OUTPATIENT
Start: 2024-04-09

## 2024-04-09 ASSESSMENT — ACTIVITIES OF DAILY LIVING (ADL)
TAKING_MEDICATION: TOTAL CARE
MANAGING_FINANCES: TOTAL CARE
GROCERY_SHOPPING: TOTAL CARE
DRESSING: DEPENDENT
DOING_HOUSEWORK: TOTAL CARE
BATHING: DEPENDENT

## 2024-04-09 ASSESSMENT — PATIENT HEALTH QUESTIONNAIRE - PHQ9
2. FEELING DOWN, DEPRESSED OR HOPELESS: NOT AT ALL
1. LITTLE INTEREST OR PLEASURE IN DOING THINGS: NOT AT ALL
SUM OF ALL RESPONSES TO PHQ9 QUESTIONS 1 AND 2: 0

## 2024-04-09 NOTE — PROGRESS NOTES
"Subjective   Reason for Visit: Elsa Bell is an 24 y.o. female here for a Medicare Wellness visit.     Past Medical, Surgical, and Family History reviewed and updated in chart.    Reviewed all medications by prescribing practitioner or clinical pharmacist (such as prescriptions, OTCs, herbal therapies and supplements) and documented in the medical record.    HPI    complex PMxh including [place]-McDermid syndrome, autism, largely nonverbal, presenting CPE.     Elsa here today with mother Bethany. She is doing well at her adult day care program. Had a recent ear infection that has since been cleared with abx. No hospitalizations or surgeries since last in.      Re: Autism - essentially nonverbal. No longer on stimulants, has done wonders for her anxiety. See psych and neuro notes; on a few meds and doing well.      Re: CM - most recent TTE stable 2023. No BP issues. No CP. Energy levels are OK.      Re: GI - uses miralax twice a day, has loose stools or else she'll be constipated and won't have any BMs. Struggles with urinary incontinence on occasion as well.     PMHx, FHx, Social Hx, Surg Hx personally reviewed at this appointment. No pertinent findings and/or changes from prior (if applicable).     ROS: Denies wt gain/loss f/c HA LoC CP SOB NVDC. See HPI above, and scanned sheet (if applicable). All other systems are reviewed and are without complaint.     Patient Care Team:  Leroy Ruiz MD as PCP - General  Leroy Ruiz MD as PCP - [facility] Medicaid PCP  Leroy Ruiz MD as PCP - [facility] [employer] PCP  Ilana Rodriguez DO as Psychiatrist (Psychiatry)  Judy Freire DO as Consulting Physician (Endocrinology)     Review of Systems    Objective   Vitals:  /82   Pulse 99   Temp 36.4 °C (97.5 °F)   Ht 1.6 m (5' 3\")   Wt 85.7 kg (189 lb)   SpO2 96%   BMI 33.48 kg/m²       Physical Exam  Gen: syndromic appearance. Largely nonverbal.   HEENT: NC/AT. Anicteric sclera, symmetric " pupils. MMM no thrush.  Neck: Soft, supple. No LAD. No goiter.   CV: tachycardi nl s1s2 no m/r/g  Pulm: coarse upper airway sounds, CTAB otherwise after coughing  GI: soft NTND BS+ no hsm  Ext: WWP no edema  Neuro: II-XII grossly intact, nonfocal systemic findings  MSK: 5/5 strength b/l UE and LE  Gait: unremarkable        Assessment/Plan   Complex Pmhx due to her Yelena-McDermid Syndrome and autism, however stable/at baseline.    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

## 2024-04-09 NOTE — PATIENT INSTRUCTIONS
Please stop at the lab ([address]) to complete your blood and/or urine work that your specialists have already ordered for you.    We will contact you with the results at my soonest convenience. I strongly urge you to use [employer] as this is the quickest and easiest way to access your results and receive my correspondences.    I have renewed your chronic medications today.     I will fill out any forms that you need; feel free to send through [employer] or drop them off.

## 2024-05-15 RX ORDER — NORTRIPTYLINE HYDROCHLORIDE 50 MG/1
50 CAPSULE ORAL NIGHTLY
Status: CANCELLED | OUTPATIENT
Start: 2024-05-15

## 2024-05-21 DIAGNOSIS — F41.3 OTHER MIXED ANXIETY DISORDERS: ICD-10-CM

## 2024-05-21 DIAGNOSIS — F90.2 ATTENTION DEFICIT HYPERACTIVITY DISORDER (ADHD), COMBINED TYPE: ICD-10-CM

## 2024-05-21 DIAGNOSIS — Q93.52 PHELAN-MCDERMID 22Q13 DELETION SYNDROME: ICD-10-CM

## 2024-05-21 RX ORDER — NORTRIPTYLINE HYDROCHLORIDE 50 MG/1
CAPSULE ORAL
Qty: 90 CAPSULE | Refills: 1 | Status: SHIPPED | OUTPATIENT
Start: 2024-05-21

## 2024-07-01 ENCOUNTER — APPOINTMENT (OUTPATIENT)
Dept: BEHAVIORAL HEALTH | Facility: CLINIC | Age: 24
End: 2024-07-01
Payer: COMMERCIAL

## 2024-07-01 DIAGNOSIS — G47.9 SLEEP DISORDER: ICD-10-CM

## 2024-07-01 DIAGNOSIS — Z09 PSYCHIATRIC FOLLOW-UP: ICD-10-CM

## 2024-07-01 DIAGNOSIS — F79 INTELLECTUAL DISABILITY: ICD-10-CM

## 2024-07-01 DIAGNOSIS — F84.0 AUTISM SPECTRUM DISORDER (HHS-HCC): ICD-10-CM

## 2024-07-01 DIAGNOSIS — F42.8 OTHER OBSESSIVE-COMPULSIVE DISORDERS: ICD-10-CM

## 2024-07-01 DIAGNOSIS — Z91.89 AT RISK FOR SIDE EFFECT OF MEDICATION: ICD-10-CM

## 2024-07-01 DIAGNOSIS — Z86.59 PSYCHIATRIC FOLLOW-UP: ICD-10-CM

## 2024-07-01 DIAGNOSIS — F41.3 OTHER MIXED ANXIETY DISORDERS: ICD-10-CM

## 2024-07-01 DIAGNOSIS — Q93.52 PHELAN-MCDERMID 22Q13 DELETION SYNDROME: ICD-10-CM

## 2024-07-01 PROCEDURE — 99215 OFFICE O/P EST HI 40 MIN: CPT | Performed by: STUDENT IN AN ORGANIZED HEALTH CARE EDUCATION/TRAINING PROGRAM

## 2024-07-01 PROCEDURE — 1036F TOBACCO NON-USER: CPT | Performed by: STUDENT IN AN ORGANIZED HEALTH CARE EDUCATION/TRAINING PROGRAM

## 2024-07-01 NOTE — PATIENT INSTRUCTIONS
AFTER VISIT SUMMARY      Date: 7/1/2024  Appointment with Psychiatrist - Dr. Rodriguez      Reason for Visit:  -Routine follow-up/Medication management      Discussed during Appointment:   -Physical health, psychiatric/behavioral symptoms, daily functioning  -Treatment plan/management  -Medication      Clinical Impression/Status Update:  Overall, appears psychiatrically and behaviorally stable. Report of daytime sedation that resolved with stopping scheduled 9:00 clonidine. Also, report of evening sedation that resolved with stopping scheduled 17:00 clonidine. Anxiety reportedly resolved. Refills not due yet but have made note of that in her chart. If improvement in patient noted with change in medications initiated by mom is not sustained, will see her earlier. Otherwise will see her in 3 months. No new issues/concerns reported. Will continue current treatment plan and make no medication changes today. Follow up in 3 months.      #Clinic Policies/Procedures  -Refills  Prescriptions will be sent to your pharmacy with enough refills to last until your next appointment. For established patients, we typically provide 6 refills for regular meds and 3 refills for controlled substances (e.g., ADHD stimulant medication, benzodiazepines such as lorazepam). We will not provide additional refills beyond that without having an appointment. You can schedule an appointment by calling our office at [phone].     -Paperwork Requests (e.g., [employer] for guardianship)  We ask that you please schedule an appointment if needing paperwork filled out by the doctor (e.g., [employer], FMLA form).  Please provide as much information as possible about your request and email the doctor any forms needing to be filled out prior to the appointment.       ===========================  INSTRUCTIONS/RECOMMENDATIONS  ===========================    #Medication   -No medication changes made today  --Continue taking your psych medications the  same as usual    --Refills will be sent to your pharmacy    >>For prior authorization issues at the pharmacy -> Call the office and ask to talk to Nurse Garces.      #Technical Issues with Epic -> Please help us improve the Epic experience  To help identify issues needing to be fixed and improve patient care, please report any issues you experience with Epic or  [employer], such as difficulty connecting to video during virtual visits.  [phone]      #Follow-up  --Your next appointment is scheduled for 9/30/2024 at 3:00pm (virtual visit with [employer])    *If having new/worsening symptoms, please call the clinic ([phone]) to discuss being seen sooner   >>If unable to reach the office, send me an email at [email or web address]

## 2024-07-01 NOTE — PROGRESS NOTES
OTHERS ATTENDING APPOINTMENT:  -Mother  *Presence necessary as independent historian given patient's intellectual/developmental disability and/or impaired communication abilities      LAST INTERVENTION  Last seen about 3 month ago in April 2024. Report of improved sleep. No medication changes.      HISTORY OF PRESENT ILLNESS    #Interval Change  -Report of sedation more than usual which has since resolved with medication changes made by mother.      -Anxiety  Stable.  -No report of significant restlessness, pacing, or other signs suggesting psychomotor agitation.   -No report of signs/symptoms consistent with separation anxiety or panic attacks.  Recall from prior: Seeming anxious and more perseverating in the setting of poor sleep. Difficulty focusing. Baseline anxiety typically consists of asking questions repeatedly and getting preoccupied with something that is worrisome to her.         -Behavior  Stable. No report of challenging behavior since last visit.  Recall from prior:   Sitting up, and going back to bed as reviewed by video camera. Mother noted that patient wasn't awake. Agitation and destruction of property. Patient is described as having times where she will ignore and/or refuse to follow instructions and engage in desired activity on her own terms instead. Mother explains that patient does what she wants when she wants to. Will not wait patiently like before. Also, will reportedly “do what she needs to” in order to be able to do what she wants, including pushing/shoving and at times hitting others. Once patient has done what she wanted to do, the behavior terminates on its own and in most cases patient will be her usual self the rest of the time. Overall course of this sort of behavior has varied in presence/frequency. Comes and goes. Can last up to a few days. Typically resolves in less than a week. Occurs in finite episodes rather than all the time during those days. After an incident patient goes  back to her usual self the rest of the day. Severity has remained stable since onset. This behavior per mum never gets severe or frequent enough to call our office about challenging behavior. No report of significant self-injurious behavior.      -Sleep  Sleep is reportedly improved.  Recall from prior: Patient still rolls while on her bed but no more sitting up except when she wakes up at 5 am to use the bathroom.   Recall from prior: Report of sleep disturbance coinciding with death in the family. Patient reportedly used to have multiple awakening and used to be easily redirected. With recent disturbance, patient goes into her parents' bedroom seeking reassurance.   *Of note, mother reports that patient's grandfather had Lewy body dementia.    Recall from prior: Regular sleep schedule, adequate sleep time (at least 8 hours most nights), restful. Sleeps between 12 and 5 am, mother reports 4 to 10 awakenings and then goes back to sleep. Reports of tossing and turning while sleeping. No report of morning sedation.       -Medication  Mother discontinued the scheduled 9 am and 5 pm doses of clonidine due to excessive sedation and sleepiness.  Endorses medication adherence. No report of excessive sedation, dizziness, tremors, stiffness, drooling, shuffling gait. No report of significant issues with constipation recently.   -No report of significant change in frequency of use of PRNs for behaviors/agitation. Needing extra half clonidine prn 1 to 2 times a week.   Recall from prior: increased sedation with Ativan.      -Health  Stable.  No report of hospitalizations, ED visits, new/worsening medical issues, or changes in non-psych medication since last visit. Reports being fully covid vaccinated. Rare migraines consistent with baseline.  No report of signs/symptoms suggesting organic etiology of behavior exacerbation aside from sleep disturbance.         REVIEW OF SYMPTOMS  -Depression: negative  -Anxiety: see  HPI  -Psychosis: negative  -Kathrine: negative  -Aggression: see HPI  -Self-injury: skin-picking, no worse than baseline  -Sleep: as per HPI  -Appetite: No issues reported. No report of pica. No changes from baseline  -Medical ROS: patient does not endorse difficulty urinating, constipation, seizures, rash, or polydipsia.  *All other systems have been reviewed and are negative for complaint unless otherwise noted above      PSYCHIATRIC HISTORY  No prior hospitalizations  There was a concern for staring spells and possible seizures when patient was a child. Has series of EEGs There were some results that suggested signs of seizures. 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 Last normal EEG was in 2006.      MEDICATION HISTORY  Amitriptyline - worse aggression?  Clomipramine - worse aggression?  MPH stimulants (Metadate) - unknown outcome  Guanfacine - unclear benefit  Vyvanse - helpful initially but recently causing some aggression      MEDICAL HISTORY  PCP: Dr. 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  Behavior: distant, mildly avoidant  Communication: Stereotypic words/phrases.  Motor: No involuntary movements.  MSK: No TD,  tics, or tremor appreciated. AIMS 0.   Mood: Euthymic  Affect: Full range. Appropriately reactive; smiles/laughs when watching something funny.  Thought processes: Coherent. [place]  Thought content: Unable to assess due to limited verbal abilities and ID  Perception: Does not appear to be experiencing hallucinations.  Orientation: Responds to her name.  Intellectual ability: Impaired.  Attention/concentration: alert; attentive.  Insight: Poor  Judgment: Poor         RISK ASSESSMENT   Patient felt to be at low acute and imminent risk of harm to self and others based on consideration of her risk and protective factors, as well as evaluation of her current clinical condition. She does not currently meet criteria for inpatient psychiatric hospitalization given that she does not exhibit evidence of significant deterioration in baseline psychiatric illness, aggression, self-injury, and ability to care for self. There is no evidence that patient’s current caregivers/living environment are unable to safely manage patient’s behavior. Overall risk mitigated by continued psychiatric follow-up care, psychopharmacologic intervention, 24/7 supervision, and [facility] services. Patient/caregivers are aware of emergency psychiatric resources available in the event of an acute psychiatric emergency, Mobile Crisis, 911, and local ER.        #Psychotherapy provided   -Provided 20 minutes of psychotherapy to patient and/or family/caregivers    -Psychotherapeutic interventions utilized:   --Supportive  -Target issues/Main topics discussed:  --Psychiatric symptoms, behavior, daily life, stressors, family/friends, day program, sleep  -Response to therapy:  --Actively participated and responded favorably to the above psychotherapeutic interventions       _________________  IMPRESSION  Female with [place]-McDermid syndrome and Moderate intellectual disability presenting for routine follow-up.       ###  AS OF THIS APPOINTMENT:  Overall,  appears psychiatrically and behaviorally stable. Report of daytime sedation that resolved with stopping scheduled 9:00 clonidine. Also, report of evening sedation that resolved with stopping scheduled 17:00 clonidine. Anxiety reportedly resolved. Refills not due yet but have made note of that in her chart. If improvement in patient noted with change in medications initiated by mom is not sustained, will see her earlier. Otherwise will see her in 3 months. No new issues/concerns reported. Will continue current treatment plan and make no medication changes today. Follow up in 3 months.  ###      Diagnoses:  -[place]-McDermid syndrome  -Intellectual disability  -ASD  -OCD  -GRACE  -ADHD      PLAN/MANAGEMENT/RECOMMENDATIONS                 #Visit Complexity  -Visit of high complexity given patient's intellectual/developmental disability and/or impaired communication abilities resulting in need for the presence of a third party (mother) to provide clinical information and history.      #Medication Management  -Continue   --Valium 5 mg PRN and another 5 mg if not effective within 45 minutes for agitation  --Lorazepam 1 mg PRN up to twice a day for agitation  --Clonidine PRN 0.1mg bid for anxiety/agitation  ---Take half tablet PRN bid. If minimal response within about 45 minutes, give another half tablet; Mother administers this at 7:00 and 12:00. *As of 07/01/2024, taking the 9am and 5pm doses as a prn  --Trazodone 200mg QHS  --nortriptyline 50mg QHS for ADHD and migraines  --cyproheptadine 12mg QHS for migraines  --NAC 1000mg BID for OCD/skin picking  --melatonin 5 mg daily      #Medication Considerations  -Medication consent/assent:   Risks, benefits, alternatives, off-label uses, black box warnings, and frequent/important side effects of medications have been discussed with patient/caregiver. To the extent possible, they have voiced understanding and agreement with recommended use of psychotropic medication.     -Medical  necessity for continued treatment with psychotropic medication:      [] Symptom reduction        [] Improvement in functioning      [x] Reduce risk of harm to self/others      [x] Maintenance therapy to prevent deterioration in functioning      -Rational for not reducing medication dose at this time:           [x] Significant risk of deterioration in functioning       [x] Concern for elevating risk of harm to self/others      [] Prior dose reduction unsuccessful and/or harmful      [] Psychiatric/behavioral condition not adequately stabilized/optimized       [x] Medication regimen recently modified          -OARRS  --I have personally reviewed patient's OARRS report. I have considered the risks of abuse, dependence, addiction, and diversion and feel that the potential benefits of treatment with a controlled substance currently outweigh the potential risks.        -Risk/Benefit assessment:  There is no report of signs/symptoms consistent with medication-induced impairment in daily functioning. At this time, benefits of medication felt to outweigh potential risks. But we will continue to reassess need for psychotropic medication at regular 3 to 6 month intervals, or sooner as clinically indicated.         #Medication Monitoring Plan:   -Labs next due: April 2024  --Labs to monitor: CBC, CMP, TSH/T4, lipid panel, Hgb A1c, EKG      #Psychotherapy with E/M services provided as documented above       #MDM/Complexity Issues    [] Chronic medical comorbidities     [x] Chronic risk of harm to self/others     [x] Intellectual/Developmental disability     [x] Need for independent historian due to intellectual/developmental disability and/or           impaired communication abilities     [] Controlled substance medication requiring regular monitoring     [x] Medication requiring significant ongoing monitoring for toxicity     #Counseling Provided     [x] Diagnostic impression/prognosis      [x] Risks and benefits of treatment  options     [x] Instruction for management/treatment and follow-up     [x] Educated patient/caregiver about: behavioral interventions, sleep hygiene, safety planning                  #Coordination of care provided    [x] Family    [] Caregiver/DSP/Staff       [] Agency supervisor       [] Nursing staff      [] SSA/          [] Therapist                     [] Guardian         #Follow-up      -in 3 months, or sooner if new/worsening symptoms         >> Scheduled virtual Follow-up Appt on 9/30/2024 at 15:00         Time:  Prep time on date of the patient encounter: 5 minutes  Time spent directly with patient/family/caregiver: 45 minutes  Additional time spent on patient care activities: 10 minutes   Documentation time: 10 minutes  Total time on date of patient encounter: 70 minutes        Scribe Attestation  By signing my name below, IArely , Scribe   attest that this documentation has been prepared under the direction and in the presence of Ilana Rodriguez DO.

## 2024-07-29 PROBLEM — F41.3 OTHER MIXED ANXIETY DISORDERS: Status: ACTIVE | Noted: 2023-02-22

## 2024-09-25 DIAGNOSIS — Q93.52 PHELAN-MCDERMID 22Q13 DELETION SYNDROME: ICD-10-CM

## 2024-09-25 DIAGNOSIS — G43.909 MIGRAINE WITHOUT STATUS MIGRAINOSUS, NOT INTRACTABLE, UNSPECIFIED MIGRAINE TYPE: ICD-10-CM

## 2024-09-27 RX ORDER — CYPROHEPTADINE HYDROCHLORIDE 4 MG/1
TABLET ORAL
Qty: 90 TABLET | Refills: 6 | Status: SHIPPED | OUTPATIENT
Start: 2024-09-27

## 2024-09-30 ENCOUNTER — APPOINTMENT (OUTPATIENT)
Dept: BEHAVIORAL HEALTH | Facility: CLINIC | Age: 24
End: 2024-09-30
Payer: COMMERCIAL

## 2024-09-30 DIAGNOSIS — Z09 PSYCHIATRIC FOLLOW-UP: ICD-10-CM

## 2024-09-30 DIAGNOSIS — Q93.52 PHELAN-MCDERMID 22Q13 DELETION SYNDROME: ICD-10-CM

## 2024-09-30 DIAGNOSIS — F90.2 ATTENTION DEFICIT HYPERACTIVITY DISORDER (ADHD), COMBINED TYPE: ICD-10-CM

## 2024-09-30 DIAGNOSIS — G43.909 MIGRAINE WITHOUT STATUS MIGRAINOSUS, NOT INTRACTABLE, UNSPECIFIED MIGRAINE TYPE: ICD-10-CM

## 2024-09-30 DIAGNOSIS — Z86.59 PSYCHIATRIC FOLLOW-UP: ICD-10-CM

## 2024-09-30 DIAGNOSIS — F84.0 AUTISM SPECTRUM DISORDER (HHS-HCC): ICD-10-CM

## 2024-09-30 DIAGNOSIS — F79 INTELLECTUAL DISABILITY: ICD-10-CM

## 2024-09-30 DIAGNOSIS — F41.3 OTHER MIXED ANXIETY DISORDERS: ICD-10-CM

## 2024-09-30 DIAGNOSIS — F42.8 OTHER OBSESSIVE-COMPULSIVE DISORDER: ICD-10-CM

## 2024-09-30 DIAGNOSIS — F51.05 INSOMNIA RELATED TO ANOTHER MENTAL DISORDER: ICD-10-CM

## 2024-09-30 PROCEDURE — 90785 PSYTX COMPLEX INTERACTIVE: CPT | Performed by: STUDENT IN AN ORGANIZED HEALTH CARE EDUCATION/TRAINING PROGRAM

## 2024-09-30 PROCEDURE — 99214 OFFICE O/P EST MOD 30 MIN: CPT | Performed by: STUDENT IN AN ORGANIZED HEALTH CARE EDUCATION/TRAINING PROGRAM

## 2024-09-30 PROCEDURE — 90833 PSYTX W PT W E/M 30 MIN: CPT | Performed by: STUDENT IN AN ORGANIZED HEALTH CARE EDUCATION/TRAINING PROGRAM

## 2024-09-30 NOTE — PATIENT INSTRUCTIONS
AFTER VISIT SUMMARY      Date: 9/30/2024  Appointment with Psychiatrist - Dr. Rodriguez      Reason for Visit:  -Routine follow-up/Medication management      Discussed during Appointment:   -Physical health, psychiatric/behavioral symptoms, daily functioning, new issues/concerns     -Treatment plan/management, including medication      Clinical Impression/Status Update:  Patient is reported to be at her psychiatric and behavioral baseline. No new issues/concerns. Will discontinue scheduled clonidine given that she's doing well. Will send paperwork to day program for discontinuation.   -Current medication regimen appears to be appropriately effective without experiencing significant or bothersome side effects.  --We will continue current medications and see her again in 4 months.      #Clinic Policies/Procedures  -Refills  Prescriptions will be sent to your pharmacy with enough refills to last until your next appointment. For established patients, we typically provide 6 refills for regular meds and 3 refills for controlled substances (e.g., ADHD stimulant medication, benzodiazepines such as lorazepam). We will not provide additional refills beyond that without having an appointment. You can schedule an appointment by sending a [employer] message or calling our office at [phone].     -Paperwork Requests (e.g., Expert Eval for guardianship)  We ask that you please schedule an appointment if needing paperwork filled out by the doctor (e.g., Expert Eval, FMLA form).  Please provide as much information as possible about your request and email the doctor any forms needing to be filled out prior to the appointment.       ===========================  INSTRUCTIONS/RECOMMENDATIONS  ===========================    #Medication   -Medication changes made today:   --We are discontinuing your scheduled clonidine  --We are starting you on clonidine 0.1 mg as needed up to 3 times daily. To repeat dose if ineffective after 45  minutes      >>For prior authorization issues at the pharmacy -> Call the office and ask to talk to Nurse Gibson       [employer] - Online Patient Portal   For Help activating your [employer] Account or setting up Proxy Access, please call the dedicated Patient Support Line at [phone].    If having technical Issues with Epic or [employer]-> Please help us improve the Epic experience  To help identify issues needing to be fixed and improve patient care, please report any issues you experience with Epic or  [employer], such as difficulty connecting to video during virtual visits.  [phone]      #Follow-up  --Your next appointment is scheduled for 1/27/2025 at 3:00 pm (virtual visit with Epic)    *If having new/worsening symptoms, please send a [employer] message or call the clinic ([phone]) to discuss being seen sooner   >>If unable to reach the office, send me an email at [email or web address]

## 2024-09-30 NOTE — PROGRESS NOTES
OTHERS ATTENDING APPOINTMENT:  -Mother  *Presence necessary as independent historian given patient's intellectual/developmental disability and/or impaired communication abilities    LAST INTERVENTION  Last seen about 2 month ago in July 2024. Improvement in daytime and evening sedation that resolved with stopping scheduled 9:00 and 17:00 clonidine respectively. Otherwise, no new issues/concerns. No medication changes.        HISTORY OF PRESENT ILLNESS    #Interval Change  -Patient appears to have remained psychiatrically and behaviorally stable since last visit.  -No report of new issues/concerns  -Mother requesting [place]      #Anxiety  Stable  -No report of significant restlessness, pacing, or other signs suggesting psychomotor agitation.   -No report of signs/symptoms consistent with separation anxiety or panic attacks.  Recall from prior: Seeming anxious and more perseverating in the setting of poor sleep. Difficulty focusing. Baseline anxiety typically consists of asking questions repeatedly and getting preoccupied with something that is worrisome to her.         #Behavior  Stable. No report of challenging behavior since last visit.  Recall from prior:   Sitting up, and going back to bed as reviewed by video camera. Mother noted that patient wasn't awake. Agitation and destruction of property. Patient is described as having times where she will ignore and/or refuse to follow instructions and engage in desired activity on her own terms instead. Mother explains that patient does what she wants when she wants to. Will not wait patiently like before. Also, will reportedly “do what she needs to” in order to be able to do what she wants, including pushing/shoving and at times hitting others. Once patient has done what she wanted to do, the behavior terminates on its own and in most cases patient will be her usual self the rest of the time. Overall course of this  sort of behavior has varied in presence/frequency. Comes and goes. Can last up to a few days. Typically resolves in less than a week. Occurs in finite episodes rather than all the time during those days. After an incident patient goes back to her usual self the rest of the day. Severity has remained stable since onset. This behavior per mum never gets severe or frequent enough to call our office about challenging behavior. No report of significant self-injurious behavior.      #Sleep  Stable. Mother reports no sleeping difficulty.  Recall from prior: Patient still rolls while on her bed but no more sitting up except when she wakes up at 5 am to use the bathroom.   Recall from prior: Report of sleep disturbance coinciding with death in the family. Patient reportedly used to have multiple awakening and used to be easily redirected. With recent disturbance, patient goes into her parents' bedroom seeking reassurance.   *Of note, mother reports that patient's grandfather had Lewy body dementia.    Recall from prior: Regular sleep schedule, adequate sleep time (at least 8 hours most nights), restful. Sleeps between 12 and 5 am, mother reports 4 to 10 awakenings and then goes back to sleep. Reports of tossing and turning while sleeping. No report of morning sedation.       #Medication  -Reports medication adherence.  -No report of concerns for significant medication side effects.   --Mother requesting paperwork for discontinuing 12 pm [place]idine [place]  -No report of significant change in frequency of use of PRNs for behaviors/agitation.   Recall from prior: increased sedation with Ativan.      #Health  -No report of hospitalizations, ED visits, new/worsening medical issues, or changes in non-psych medication since last visit.   -Rare migraines consistent with baseline.  -Patient reports will be getting her flu shot in October.   -Patient reportedly does not have a regular dentist anymore and looking for  one          REVIEW OF SYMPTOMS  -Depression: negative  -Anxiety: see HPI  -Psychosis: negative  -Kathrine: negative  -Aggression: see HPI  -Self-injury: skin-picking, no worse than baseline  -Sleep: as per HPI  -Appetite: No issues reported. No report of pica. No changes from baseline  -Medical ROS: patient does not endorse difficulty urinating, constipation, seizures, rash, or polydipsia.  *All other systems have been reviewed and are negative for complaint unless otherwise noted above      PSYCHIATRIC HISTORY  No prior hospitalizations  There was a concern for staring spells and possible seizures when patient was a child. Has series of EEGs There were some results that suggested signs of seizures. 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 Last normal EEG was in 2006.      MEDICATION HISTORY  Amitriptyline - worse aggression?  Clomipramine - worse aggression?  MPH stimulants (Metadate) - unknown outcome  Guanfacine - unclear benefit  Vyvanse - helpful initially but recently causing some aggression      MEDICAL HISTORY  PCP: Dr. 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  Behavior: distant, mildly avoidant  Communication:  Stereotypic words/phrases.  Motor: No involuntary movements.  MSK: No TD, tics, or tremor appreciated. AIMS 0.   Mood: Euthymic  Affect: Full range. Appropriately reactive; smiles/laughs when watching something funny.  Thought processes: Coherent. [place]  Thought content: Unable to assess due to limited verbal abilities and ID  Perception: Does not appear to be experiencing hallucinations.  Orientation: Responds to her name.  Intellectual ability: Impaired.  Attention/concentration: alert; attentive.  Insight: Poor  Judgment: Poor         RISK ASSESSMENT   Patient felt to be at low acute and imminent risk of harm to self and others based on consideration of her risk and protective factors, as well as evaluation of her current clinical condition. She does not currently meet criteria for inpatient psychiatric hospitalization given that she does not exhibit evidence of significant deterioration in baseline psychiatric illness, aggression, self-injury, and ability to care for self. There is no evidence that patient's current caregivers/living environment are unable to safely manage patient's behavior. Overall risk mitigated by continued psychiatric follow-up care, psychopharmacologic intervention, 24/7 supervision, and [facility] services. Patient/caregivers are aware of emergency psychiatric resources available in the event of an acute psychiatric emergency, Mobile Crisis, 911, and local ER.        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       ###  AS OF THIS APPOINTMENT:  Patient is reported to be at her psychiatric and behavioral baseline. No new issues/concerns. Will discontinue scheduled clonidine given that she's doing well. Will re-word Clonidine script for use as needed up to 3 times a day.Will plan to send paperwork to day program for discontinuation.   -Currently doesn't have a regular dentist. Previous one stopped taking medicare/medicaid. Discussed places where to find some providers accepting medicare/medicaid. Will continue to monitor symptoms and plan to see her again in 3 to 4 months.  ###        Diagnoses:  -[place]-McDermid syndrome  -Intellectual disability  -ASD  -OCD  -GRACE  -ADHD      PLAN/MANAGEMENT/RECOMMENDATIONS             >>MED CHANGES<<  #Visit Complexity  -Visit of high complexity given patient's intellectual/developmental disability and/or impaired communication abilities resulting in need for the presence of a third party (mother) to provide clinical information and history.      #Medication Management  -Discontinue:   --Clonidine PRN 0.1mg at noon for anxiety/agitation  -Change to:  --Clonidine 0.1 mg PRN up to 3 times daily. To repeat after 45 minutes if first dose ineffective  -Continue:   --Trazodone 200mg QHS  --nortriptyline 50mg QHS for ADHD and migraines  --cyproheptadine 12mg QHS for migraines  --NAC purchased OTC for OCD/skin picking- need to confirm dose with parent  --melatonin 5 mg daily      #Medication Considerations  -Medication consent/assent:   Risks, benefits, alternatives, off-label uses, black box warnings, and frequent/important side effects of medications have been discussed with patient/caregiver. To the extent possible, they have voiced understanding and agreement with recommended use of psychotropic medication.     -Medical necessity for continued treatment with psychotropic medication:      [] Symptom reduction        []  Improvement in functioning      [x] Reduce risk of harm to self/others      [x] Maintenance therapy to prevent deterioration in functioning      -Rational for not reducing medication dose at this time:           [x] Significant risk of deterioration in functioning       [x] Concern for elevating risk of harm to self/others      [] Prior dose reduction unsuccessful and/or harmful      [] Psychiatric/behavioral condition not adequately stabilized/optimized       [x] Medication regimen recently modified          -OARRS  --I have personally reviewed patient's OARRS report. I have considered the risks of abuse, dependence, addiction, and diversion and feel that the potential benefits of treatment with a controlled substance currently outweigh the potential risks.        -Risk/Benefit assessment:  There is no report of signs/symptoms consistent with medication-induced impairment in daily functioning. At this time, benefits of medication felt to outweigh potential risks. But we will continue to reassess need for psychotropic medication at regular 3 to 6 month intervals, or sooner as clinically indicated.         #Medication Monitoring Plan:   -Labs next due: April 2025  --Labs to monitor: CBC, CMP, TSH/T4, lipid panel, Hgb A1c, EKG      #Psychotherapy with E/M services provided as documented above       #MDM/Complexity Issues    [] Chronic medical comorbidities     [x] Chronic risk of harm to self/others     [x] Intellectual/Developmental disability     [x] Need for independent historian due to intellectual/developmental disability and/or           impaired communication abilities     [] Controlled substance medication requiring regular monitoring     [x] Medication requiring significant ongoing monitoring for toxicity     #Counseling Provided     [x] Diagnostic impression/prognosis      [x] Risks and benefits of treatment options     [x] Instruction for management/treatment and follow-up     [x] Educated patient/caregiver  about: behavioral interventions, sleep hygiene, safety planning                  #Coordination of care provided    [x] Family    [] Caregiver/DSP/Staff       [] Agency supervisor       [] Nursing staff      [] SSA/          [] Therapist                     [] Guardian         #Follow-up      -in 3 to 4 months, or sooner if new/worsening symptoms         >> Scheduled virtual Follow-up Appt on 1/27/2025 at 15:00         Time:  Prep time on date of the patient encounter: 5 minutes  Time spent directly with patient/family/caregiver: 45 minutes  Additional time spent on patient care activities: 10 minutes   Documentation time: 10 minutes  Total time on date of patient encounter: 70 minutes        Scribe Attestation  By signing my name below, I, Arely Fountain, [employer]   attest that this documentation has been prepared under the direction and in the presence of Ilana Rodriguez DO.

## 2024-10-10 ENCOUNTER — TELEPHONE (OUTPATIENT)
Dept: BEHAVIORAL HEALTH | Facility: CLINIC | Age: 24
End: 2024-10-10
Payer: COMMERCIAL

## 2024-10-14 PROBLEM — F51.05 INSOMNIA RELATED TO ANOTHER MENTAL DISORDER: Status: ACTIVE | Noted: 2024-10-14

## 2024-10-14 RX ORDER — CYPROHEPTADINE HYDROCHLORIDE 4 MG/1
TABLET ORAL
Qty: 90 TABLET | Refills: 6 | Status: SHIPPED | OUTPATIENT
Start: 2024-10-14

## 2024-10-14 RX ORDER — CLONIDINE HYDROCHLORIDE 0.1 MG/1
TABLET ORAL
Qty: 90 TABLET | Refills: 6 | Status: SHIPPED | OUTPATIENT
Start: 2024-10-14

## 2024-10-14 RX ORDER — NORTRIPTYLINE HYDROCHLORIDE 50 MG/1
CAPSULE ORAL
Qty: 90 CAPSULE | Refills: 6 | Status: SHIPPED | OUTPATIENT
Start: 2024-10-14

## 2024-10-14 RX ORDER — TRAZODONE HYDROCHLORIDE 100 MG/1
TABLET ORAL
Qty: 60 TABLET | Refills: 6 | Status: SHIPPED | OUTPATIENT
Start: 2024-10-14

## 2024-10-14 RX ORDER — ACETYLCYSTEINE 600 MG
CAPSULE ORAL
Start: 2024-10-14

## 2024-10-16 ENCOUNTER — TELEPHONE (OUTPATIENT)
Dept: PRIMARY CARE | Facility: CLINIC | Age: 24
End: 2024-10-16
Payer: COMMERCIAL

## 2024-10-16 DIAGNOSIS — G90.1 DYSAUTONOMIA (MULTI): ICD-10-CM

## 2024-10-16 DIAGNOSIS — I42.9 CARDIOMYOPATHY, UNSPECIFIED TYPE (MULTI): ICD-10-CM

## 2024-10-16 DIAGNOSIS — Q93.52 PHELAN-MCDERMID 22Q13 DELETION SYNDROME: Primary | ICD-10-CM

## 2025-01-27 ENCOUNTER — APPOINTMENT (OUTPATIENT)
Dept: BEHAVIORAL HEALTH | Facility: CLINIC | Age: 25
End: 2025-01-27
Payer: COMMERCIAL

## 2025-01-27 DIAGNOSIS — Q93.52 PHELAN-MCDERMID 22Q13 DELETION SYNDROME: ICD-10-CM

## 2025-01-27 DIAGNOSIS — G43.909 MIGRAINE WITHOUT STATUS MIGRAINOSUS, NOT INTRACTABLE, UNSPECIFIED MIGRAINE TYPE: ICD-10-CM

## 2025-01-27 DIAGNOSIS — F79 INTELLECTUAL DISABILITY: ICD-10-CM

## 2025-01-27 DIAGNOSIS — G90.1 DYSAUTONOMIA (MULTI): Chronic | ICD-10-CM

## 2025-01-27 DIAGNOSIS — Z09 PSYCHIATRIC FOLLOW-UP: ICD-10-CM

## 2025-01-27 DIAGNOSIS — G47.8: ICD-10-CM

## 2025-01-27 DIAGNOSIS — F41.1 GAD (GENERALIZED ANXIETY DISORDER): ICD-10-CM

## 2025-01-27 DIAGNOSIS — F42.8 OTHER OBSESSIVE-COMPULSIVE DISORDER: ICD-10-CM

## 2025-01-27 DIAGNOSIS — F84.0 AUTISM SPECTRUM DISORDER (HHS-HCC): ICD-10-CM

## 2025-01-27 DIAGNOSIS — F41.3 OTHER MIXED ANXIETY DISORDERS: ICD-10-CM

## 2025-01-27 DIAGNOSIS — Z86.59 PSYCHIATRIC FOLLOW-UP: ICD-10-CM

## 2025-01-27 DIAGNOSIS — I42.9 CARDIOMYOPATHY, UNSPECIFIED TYPE (MULTI): ICD-10-CM

## 2025-01-27 DIAGNOSIS — F91.8: ICD-10-CM

## 2025-01-27 DIAGNOSIS — F42.4 COMPULSIVE SKIN PICKING: ICD-10-CM

## 2025-01-27 DIAGNOSIS — K59.09 CHRONIC CONSTIPATION: ICD-10-CM

## 2025-01-27 DIAGNOSIS — F90.2 ATTENTION DEFICIT HYPERACTIVITY DISORDER (ADHD), COMBINED TYPE: ICD-10-CM

## 2025-01-27 PROBLEM — F98.4 STEREOTYPIC MOVEMENT DISORDER WITH SELF-INJURIOUS BEHAVIOR: Status: ACTIVE | Noted: 2025-01-27

## 2025-01-27 PROCEDURE — 99215 OFFICE O/P EST HI 40 MIN: CPT | Performed by: STUDENT IN AN ORGANIZED HEALTH CARE EDUCATION/TRAINING PROGRAM

## 2025-01-27 PROCEDURE — G2211 COMPLEX E/M VISIT ADD ON: HCPCS | Performed by: STUDENT IN AN ORGANIZED HEALTH CARE EDUCATION/TRAINING PROGRAM

## 2025-01-27 NOTE — PROGRESS NOTES
OTHERS ATTENDING APPOINTMENT:  -Mother  *Presence necessary as independent historian given patient's intellectual/developmental disability and/or impaired communication abilities    LAST INTERVENTION  Last seen about 4 months ago in September 2024. No report of significant change in patient's overall psychiatric and behavioral condition. Patients noon clonidine was discontinued and clonidine script was reworded for prn use. Discussed places to find some dental providers accepting medicare/medicaid.      HISTORY OF PRESENT ILLNESS    #Interval Change  -No report of significant change in patient's overall psychiatric and behavioral condition since last visit.  -No report of significant new issues/concerns.      #Anxiety  Stable.  -No report of significant restlessness, pacing, or other signs suggesting psychomotor agitation.   -No report of signs/symptoms consistent with separation anxiety or panic attacks.  Recall from prior: Seeming anxious and more perseverating in the setting of poor sleep. Difficulty focusing. Baseline anxiety typically consists of asking questions repeatedly and getting preoccupied with something that is worrisome to her.         #Behavior  Stable. No report of challenging behavior since last visit.  Recall from prior:   Sitting up, and going back to bed as reviewed by video camera. Mother noted that patient wasn't awake. Agitation and destruction of property. Patient is described as having times where she will ignore and/or refuse to follow instructions and engage in desired activity on her own terms instead. Mother explains that patient does what she wants when she wants to. Will not wait patiently like before. Also, will reportedly “do what she needs to” in order to be able to do what she wants, including pushing/shoving and at times hitting others. Once patient has done what she wanted to do, the behavior terminates on its own and in most cases patient will be her usual self the rest of the  time. Overall course of this sort of behavior has varied in presence/frequency. Comes and goes. Can last up to a few days. Typically resolves in less than a week. Occurs in finite episodes rather than all the time during those days. After an incident patient goes back to her usual self the rest of the day. Severity has remained stable since onset. This behavior per mum never gets severe or frequent enough to call our office about challenging behavior. No report of significant self-injurious behavior.      #Sleep  Stable. No report of sleep disruption. Patient typically goes to bed at 10:30 pm and wakes up at 6:30 am. Mother reports that patient goes to bed earlier on some days as soon as it gets dark when there is no provider.  Recall from prior: Patient still rolls while on her bed but no more sitting up except when she wakes up at 5 am to use the bathroom.   Recall from prior: Report of sleep disturbance coinciding with death in the family. Patient reportedly used to have multiple awakening and used to be easily redirected. With recent disturbance, patient goes into her parents' bedroom seeking reassurance.   *Of note, mother reports that patient's grandfather had Lewy body dementia.    Recall from prior: Regular sleep schedule, adequate sleep time (at least 8 hours most nights), restful. Sleeps between 12 and 5 am, mother reports 4 to 10 awakenings and then goes back to sleep. Reports of tossing and turning while sleeping. No report of morning sedation.       #Medication  -Mother reports that she administers NAC twice daily and that there has been no worsening in skin picking  -No report of significant change in frequency of use of PRNs for behaviors/agitation. Mother reports patient has not been needing it but gave patient half a pill recently during a school disruption due to the weather.  -Reports medication adherence.  -No report of concerns for significant medication side effects.   --[place]  Recall from prior: increased sedation with Ativan.      #Health  Stable. Constipation is reportedly well controlled with daily dose of Miralax.  -No report of hospitalizations, ED visits, new/worsening medical issues, or changes in non-psych medication since last visit.   -Rare migraines consistent with baseline.  -Patient reports will be getting her flu shot in October.   -Patient reportedly does not have a regular dentist anymore and looking for one          REVIEW OF SYMPTOMS  -Depression: negative  -Anxiety: see HPI  -Psychosis: negative  -Kathrine: negative  -Aggression: see HPI  -Self-injury: skin-picking, no worse than baseline  -Sleep: as per HPI  -Appetite: No issues reported. No report of pica. No changes from baseline  -Medical ROS: patient does not endorse difficulty urinating, constipation, seizures, rash, or polydipsia.  *All other systems have been reviewed and are negative for complaint unless otherwise noted above      PSYCHIATRIC HISTORY  No prior hospitalizations  There was a concern for staring spells and possible seizures when patient was a child. Has series of EEGs There were some results that suggested signs of seizures. 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 Last normal EEG was in 2006.      MEDICATION HISTORY  Amitriptyline - worse aggression?  Clomipramine - worse aggression?  MPH stimulants (Metadate) - unknown outcome  Guanfacine - unclear benefit  Vyvanse - helpful initially but recently causing some aggression      MEDICAL HISTORY  PCP: Dr. 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  Behavior: distant, mildly avoidant  Communication: Stereotypic words/phrases.  Motor: No involuntary movements.  MSK: No TD, tics, or tremor appreciated. AIMS 0.   Mood: Euthymic  Affect: Full range. Appropriately reactive; smiles/laughs when watching something funny.  Thought processes: Coherent. Hiko  Thought content: Unable to assess due to limited verbal abilities and ID  Perception: Does not appear to be experiencing hallucinations.  Orientation: Responds to her name.  Intellectual ability: Impaired.  Attention/concentration: alert; attentive.  Insight: Poor  Judgment: Poor         RISK ASSESSMENT   Patient felt to be at low acute and imminent risk of harm to self and others based on consideration of her risk and protective factors, as well as evaluation of her current clinical condition. She does not currently meet criteria for inpatient psychiatric hospitalization given that she does not exhibit evidence of significant deterioration in baseline psychiatric illness, aggression, self-injury, and ability to care for self. There is no evidence that patient's current caregivers/living environment are unable to safely manage patient's behavior. Overall risk mitigated by continued psychiatric follow-up care, psychopharmacologic intervention, 24/7 supervision, and [facility] Board services. Patient/caregivers are aware of emergency psychiatric resources available in the event of an acute psychiatric emergency, [facility], 91, and local  ER.        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       ###  AS OF THIS APPOINTMENT:  -No report of significant change in patient's overall psychiatric and behavioral condition since last visit.  -No report of significant new issues/concerns.  -Appears to be tolerating medication regimen without report of significant or bothersome side effects.   -NAC for skin picking: mother reports doing well so recently decreased OTC NAC from three times daily to twice daily. Recommended remaining at current dose for at least one month. If remains stable, would consider further de-prescribing from twice daily to once daily.  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      #Medication Management  -Continue:   --Trazodone 200mg QHS  --nortriptyline 50mg QHS for ADHD and migraines  --cyproheptadine 12mg QHS for migraines  --NAC purchased OTC for OCD/skin  picking-Mother administers twice daily.  --melatonin 5 mg daily  -PRN:  --Clonidine 0.1 mg PRN up to 3 times daily. To repeat after 45 minutes if first dose ineffective    #Medication Considerations  -Medication consent/assent:   Risks, benefits, alternatives, off-label uses, black box warnings, and frequent/important side effects of medications have been discussed with patient/caregiver. To the extent possible, they have voiced understanding and agreement with recommended use of psychotropic medication.     -Medical necessity for continued treatment with psychotropic medication:      [] Symptom reduction        [] Improvement in functioning      [x] Maintenance therapy to reduce risk of harm to self/others      [x] Maintenance therapy to prevent deterioration in functioning      -Rational for not reducing medication dose at this time:           [x] Significant risk of deterioration in functioning       [x] Concern for elevating risk of harm to self/others      [] Prior dose reduction unsuccessful and/or harmful      [] Psychiatric/behavioral condition not adequately stabilized/optimized       [x] Medication regimen recently modified          -OARRS  --I have personally reviewed patient's OARRS report. I have considered the risks of abuse, dependence, addiction, and diversion and feel that the potential benefits of treatment with a controlled substance currently outweigh the potential risks.        -Risk/Benefit assessment:  There is no report of signs/symptoms consistent with medication-induced impairment in daily functioning. At this time, benefits of medication felt to outweigh potential risks. But we will continue to reassess need for psychotropic medication at regular 3 to 6 month intervals, or sooner as clinically indicated.         #Medication Monitoring Plan:   -Labs next due: April 2025  --Labs to monitor: CBC, CMP, TSH/T4, lipid panel, Hgb A1c, EKG      #Psychotherapy with E/M services provided as  documented above       #MDM/Complexity Issues    [] Chronic medical comorbidities     [x] Chronic risk of harm to self/others     [x] Intellectual/Developmental disability     [x] Need for independent historian due to intellectual/developmental disability and/or           impaired communication abilities     [] Controlled substance medication requiring regular monitoring     [x] Medication requiring significant ongoing monitoring for toxicity     #Counseling Provided     [x] Diagnostic impression/prognosis      [x] Risks and benefits of treatment options     [x] Instruction for management/treatment and follow-up     [x] Educated patient/caregiver about: behavioral interventions, sleep hygiene, safety planning                  #Coordination of care provided    [x] Family    [] Caregiver/DSP/Staff       [] Agency supervisor       [] Nursing staff      [] SSA/          [] Therapist                     [] Guardian         #Follow-up      -in 3 to 4 months, or sooner if new/worsening symptoms         >> Scheduled virtual Follow-up Appt on 5/12/2025 at 15:00         Time:  Prep time on date of the patient encounter: 5 minutes  Time spent directly with patient/family/caregiver: 45 minutes  Additional time spent on patient care activities: 10 minutes   Documentation time: 10 minutes  Total time on date of patient encounter: 70 minutes        Scribe Attestation  By signing my name below, I, Arely Fountain, Scribe   attest that this documentation has been prepared under the direction and in the presence of Ilana Rodriguez DO.

## 2025-01-27 NOTE — PATIENT INSTRUCTIONS
AFTER VISIT SUMMARY      Date: 1/27/2025  Appointment with Psychiatrist - Dr. Rodriguez      Reason for Visit:  -Routine follow-up/Medication management      Discussed during Appointment:   -Physical health, psychiatric/behavioral symptoms, daily functioning, new issues/concerns     -Treatment plan/management, including medication      Clinical Impression/Status Update:  -No report of significant change in patient's overall psychiatric and behavioral condition since last visit.  -No report of significant new issues/concerns.  -Current medication regimen appears to be appropriately effective without experiencing significant or bothersome side effects.  --We will continue current medications and see her again in 3 months.      #Clinic Policies/Procedures  -Refills  Prescriptions will be sent to your pharmacy with enough refills to last until your next appointment. For established patients, we typically provide 6 refills for regular meds and 3 refills for controlled substances (e.g., ADHD stimulant medication, benzodiazepines such as lorazepam). We will not provide additional refills beyond that without having an appointment. You can schedule an appointment by sending a [employer]bl[employer] message or calling our office at [phone].     -Paperwork Requests (e.g., Expert Eval for guardianship)  We ask that you please schedule an appointment if needing paperwork filled out by the doctor (e.g., Expert Eval, FMLA form).  Please provide as much information as possible about your request and email the doctor any forms needing to be filled out prior to the appointment.       ===========================  INSTRUCTIONS/RECOMMENDATIONS  ===========================    #Medication   -No medication changes made today  --Continue taking your psych medications the same as usual    --Refills will be sent to your pharmacy      >>For prior authorization issues at the pharmacy -> Call the office and ask to talk to Nurse Garces.      If having  technical Issues with Epic or [employer]-> Please help us improve the Epic experience  To help identify issues needing to be fixed and improve patient care, please report any issues you experience with Epic or  [employer], such as difficulty connecting to video during virtual visits.  [phone]      #Follow-up  --Your next appointment is scheduled for 5/12/2025 at 3:00 pm (virtual visit with [facility])    *If having new/worsening symptoms, please send a [employer]Mob message or call the clinic ([phone]) to discuss being seen sooner   >>If unable to reach the office, send me an email at [email or web address]

## 2025-02-03 RX ORDER — CLONIDINE HYDROCHLORIDE 0.1 MG/1
TABLET ORAL
Qty: 90 TABLET | Refills: 6 | Status: SHIPPED | OUTPATIENT
Start: 2025-02-03

## 2025-02-03 RX ORDER — ACETYLCYSTEINE 600 MG
CAPSULE ORAL
Start: 2025-02-03

## 2025-02-03 RX ORDER — NORTRIPTYLINE HYDROCHLORIDE 50 MG/1
CAPSULE ORAL
Qty: 90 CAPSULE | Refills: 6 | Status: SHIPPED | OUTPATIENT
Start: 2025-02-03

## 2025-02-03 RX ORDER — TRAZODONE HYDROCHLORIDE 100 MG/1
TABLET ORAL
Qty: 60 TABLET | Refills: 6 | Status: SHIPPED | OUTPATIENT
Start: 2025-02-03

## 2025-02-03 RX ORDER — CYPROHEPTADINE HYDROCHLORIDE 4 MG/1
TABLET ORAL
Qty: 90 TABLET | Refills: 6 | Status: SHIPPED | OUTPATIENT
Start: 2025-02-03

## 2025-04-15 ENCOUNTER — APPOINTMENT (OUTPATIENT)
Dept: PRIMARY CARE | Facility: CLINIC | Age: 25
End: 2025-04-15
Payer: COMMERCIAL

## 2025-04-15 VITALS
SYSTOLIC BLOOD PRESSURE: 130 MMHG | HEIGHT: 63 IN | OXYGEN SATURATION: 95 % | DIASTOLIC BLOOD PRESSURE: 90 MMHG | WEIGHT: 188 LBS | TEMPERATURE: 97.4 F | BODY MASS INDEX: 33.31 KG/M2 | HEART RATE: 108 BPM

## 2025-04-15 DIAGNOSIS — F42.8 OTHER OBSESSIVE-COMPULSIVE DISORDER: ICD-10-CM

## 2025-04-15 DIAGNOSIS — Z00.00 HEALTHCARE MAINTENANCE: ICD-10-CM

## 2025-04-15 DIAGNOSIS — L73.2 AXILLARY HIDRADENITIS SUPPURATIVA: ICD-10-CM

## 2025-04-15 DIAGNOSIS — G47.9 SLEEP DISORDER: ICD-10-CM

## 2025-04-15 DIAGNOSIS — Z00.00 MEDICARE ANNUAL WELLNESS VISIT, SUBSEQUENT: Primary | ICD-10-CM

## 2025-04-15 DIAGNOSIS — Q93.52 PHELAN-MCDERMID 22Q13 DELETION SYNDROME: ICD-10-CM

## 2025-04-15 DIAGNOSIS — Z30.9 ENCOUNTER FOR CONTRACEPTIVE MANAGEMENT, UNSPECIFIED TYPE: ICD-10-CM

## 2025-04-15 PROCEDURE — 99395 PREV VISIT EST AGE 18-39: CPT | Performed by: STUDENT IN AN ORGANIZED HEALTH CARE EDUCATION/TRAINING PROGRAM

## 2025-04-15 PROCEDURE — G0439 PPPS, SUBSEQ VISIT: HCPCS | Performed by: STUDENT IN AN ORGANIZED HEALTH CARE EDUCATION/TRAINING PROGRAM

## 2025-04-15 PROCEDURE — 3008F BODY MASS INDEX DOCD: CPT | Performed by: STUDENT IN AN ORGANIZED HEALTH CARE EDUCATION/TRAINING PROGRAM

## 2025-04-15 PROCEDURE — 1036F TOBACCO NON-USER: CPT | Performed by: STUDENT IN AN ORGANIZED HEALTH CARE EDUCATION/TRAINING PROGRAM

## 2025-04-15 RX ORDER — LEVONORGESTREL AND ETHINYL ESTRADIOL 0.15-0.03
1 KIT ORAL DAILY
Qty: 91 TABLET | Refills: 3 | Status: SHIPPED | OUTPATIENT
Start: 2025-04-15

## 2025-04-15 RX ORDER — CLINDAMYCIN PHOSPHATE 11.9 MG/ML
SOLUTION TOPICAL 2 TIMES DAILY
Qty: 60 ML | Refills: 5 | Status: SHIPPED | OUTPATIENT
Start: 2025-04-15 | End: 2025-12-11

## 2025-04-15 ASSESSMENT — PATIENT HEALTH QUESTIONNAIRE - PHQ9
SUM OF ALL RESPONSES TO PHQ9 QUESTIONS 1 AND 2: 0
1. LITTLE INTEREST OR PLEASURE IN DOING THINGS: NOT AT ALL
2. FEELING DOWN, DEPRESSED OR HOPELESS: NOT AT ALL

## 2025-04-15 ASSESSMENT — ACTIVITIES OF DAILY LIVING (ADL)
DOING_HOUSEWORK: TOTAL CARE
DRESSING: DEPENDENT
GROCERY_SHOPPING: TOTAL CARE
TAKING_MEDICATION: TOTAL CARE
BATHING: DEPENDENT
MANAGING_FINANCES: TOTAL CARE

## 2025-04-15 ASSESSMENT — PAIN SCALES - GENERAL: PAINLEVEL_OUTOF10: 0-NO PAIN

## 2025-04-15 NOTE — PATIENT INSTRUCTIONS
Please stop at any  lab ([address] if you choose to use my building) to complete your blood and/or urine work that I've ordered for you.    I will contact you with the results at my soonest convenience. I strongly urge you to use [employer] as this is the quickest and easiest way to access your results and receive my correspondences.     I have renewed your chronic medications today.    I have written for a topical antibiotic for Mollie's skin condition.    Follow up with your specialists as previously scheduled.     See me yearly for a complete physical exam, and sooner as needed for sick visits

## 2025-04-15 NOTE — PROGRESS NOTES
"Subjective   Reason for Visit: Elsa Bell is an25 y.o. female who presents for a Medicare Wellness visit.    Past Medical, Surgical, and Family History reviewed and updated in chart.    Reviewed all medications by prescribing practitioner or clinical pharmacist (such as prescriptions, OTCs, herbal therapies and supplements) and documented in the medical record.    History of Present Illness  complex PMxh including [place]el[place]-McDermid syndrome, autism, largely nonverbal, presenting CPE.     Esla here today with mother Bethany. She is doing well at her adult day care program. Had a recent ear infection that has since been cleared with abx. No hospitalizations or surgeries since last in.      Re: Autism - essentially nonverbal. No longer on stimulants, has done wonders for her anxiety. See psych and neuro notes; on a few meds and doing well.      Re: CM - most recent TTE stable 2023. No BP issues. No CP. Energy levels are OK.      Re: GI - uses miralax twice a day, has loose stools or else she'll be constipated and won't have any BMs. Struggles with urinary incontinence on occasion as well.    Re: skin - has mild/mod HA in her axillae. Family requesting PRN medication.      PMHx, FHx, Social Hx, Surg Hx personally reviewed at this appointment. No pertinent findings and/or changes from prior (if applicable).     ROS: Denies wt gain/loss f/c HA LoC CP SOB NVDC. See HPI above, and       PMHx, FHx, Social Hx, Surg Hx personally reviewed at this appointment. No pertinent findings and/or changes from prior (if applicable).    ROS: Denies wt gain/loss f/c HA LoC CP SOB NVDC. See HPI above, and scanned sheet (if applicable). All other systems are reviewed and are without complaint.       Objective     /90   Pulse 108   Temp 36.3 °C (97.4 °F)   Ht 1.6 m (5' 3\")   Wt 85.3 kg (188 lb)   SpO2 95%   BMI 33.30 kg/m²      Physical Exam  Gen: syndromic appearance. Largely nonverbal.   HEENT: NC/AT. Anicteric sclera, symmetric " pupils. MMM no thrush.  Neck: Soft, supple. No LAD. No goiter.   CV: tachycardi nl s1s2 no m/r/g  Pulm: coarse upper airway sounds, CTAB otherwise after coughing  GI: soft NTND BS+ no hsm  Ext: WWP no edema  Neuro: II-XII grossly intact, nonfocal systemic findings  MSK: 5/5 strength b/l UE and LE  Gait: unremarkable          Lab Results   Component Value Date    WBC 6.5 04/09/2024    HGB 13.3 04/09/2024    HCT 39.8 04/09/2024     04/09/2024    CHOL 183 04/09/2024    TRIG 188 (H) 04/09/2024    HDL 42.9 04/09/2024    ALT 21 04/09/2024    AST 16 04/09/2024     04/09/2024    K 4.3 04/09/2024     04/09/2024    CREATININE 0.74 04/09/2024    BUN 11 04/09/2024    CO2 30 04/09/2024    TSH 0.96 04/09/2024    HGBA1C 4.7 04/09/2024     par           Current Outpatient Medications   Medication Instructions    acetylcysteine 600 mg capsule capsule Takes NAC 600mg tablets [purchased OTC from [employer]] scheduled 2 times daily for skin-picking [F42.4]    carbamide peroxide (Debrox) 6.5 % otic solution 5 drops, As needed    cholecalciferol (Vitamin D-3) 50 mcg (2,000 unit) capsule 1 capsule, Nightly    clindamycin (Cleocin T) 1 % external solution Topical, 2 times daily, apply to affected area    cloNIDine (Catapres) 0.1 mg tablet Take half tablet (0.05mg) up to 3 times daily, as needed, for PRN anxiety/ADHD [F41.3/F90.2] *Can repeat dose once if ineffective after 45 minutes.    cyproheptadine (Periactin) 4 mg tablet Take 3 tablets (12mg) scheduled once daily at bedtime for migraine prevention [G43.909]    levonorgestreL-ethinyl estrad (Seasonale) 0.15 mg-30 mcg (91) tablet 1 tablet, oral, Daily    nortriptyline (Pamelor) 50 mg capsule Take 1 capsule (50mg) scheduled once daily at bedtime for anxiety/migraine prophylaxis [F41.3/G43.909]    polyethylene glycol (MIRALAX) 34 g, Daily    traZODone (Desyrel) 100 mg tablet Take 2 tablets (200mg) scheduled once daily at bedtime for sleep [G47.8]          Assessment &  Plan  Complex Pmhx due to her Yelena-McDermid Syndrome and autism, however stable/at baseline.     # Yelena-McDermid Syndrome  - TTE current  - blood work yearly; thyroid in addition to basic labs  - handicap placard current  - will renew chronic meds PRN    # Hydradenitis Supprativa  - Clindamycin trial     # Health Maintenance  - routine blood work  - Colon Cancer Screening: Not yet indicated   - Mammogram: Not yet indicated   - DEXA: Not yet indicated   - Immunizations: UTD       Assessment & Plan  Healthcare maintenance    Orders:    levonorgestreL-ethinyl estrad (Seasonale) 0.15 mg-30 mcg (91) tablet; Take 1 tablet by mouth once daily.    CBC and Auto Differential; Future    Comprehensive Metabolic Panel; Future    Lipid Panel; Future    TSH with reflex to Free T4 if abnormal; Future    Encounter for contraceptive management, unspecified type    Orders:    levonorgestreL-ethinyl estrad (Seasonale) 0.15 mg-30 mcg (91) tablet; Take 1 tablet by mouth once daily.    Other obsessive-compulsive disorder         Axillary hidradenitis suppurativa    Orders:    clindamycin (Cleocin T) 1 % external solution; Apply topically 2 times a day. 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 The content has been reviewed and confirmed by the healthcare provider for accuracy and completeness. Patient consented to the use of audio recording and use of AI during their visit.

## 2025-04-27 LAB
ALBUMIN SERPL-MCNC: 4.1 G/DL (ref 3.6–5.1)
ALP SERPL-CCNC: 75 U/L (ref 31–125)
ALT SERPL-CCNC: 29 U/L (ref 6–29)
ANION GAP SERPL CALCULATED.4IONS-SCNC: 7 MMOL/L (CALC) (ref 7–17)
AST SERPL-CCNC: 16 U/L (ref 10–30)
BASOPHILS # BLD AUTO: 27 CELLS/UL (ref 0–200)
BASOPHILS NFR BLD AUTO: 0.4 %
BILIRUB SERPL-MCNC: 0.4 MG/DL (ref 0.2–1.2)
BUN SERPL-MCNC: 11 MG/DL (ref 7–25)
CALCIUM SERPL-MCNC: 9.1 MG/DL (ref 8.6–10.2)
CHLORIDE SERPL-SCNC: 104 MMOL/L (ref 98–110)
CHOLEST SERPL-MCNC: 173 MG/DL
CHOLEST/HDLC SERPL: 4.2 (CALC)
CO2 SERPL-SCNC: 28 MMOL/L (ref 20–32)
CREAT SERPL-MCNC: 0.71 MG/DL (ref 0.5–0.96)
EGFRCR SERPLBLD CKD-EPI 2021: 119 ML/MIN/1.73M2
EOSINOPHIL # BLD AUTO: 87 CELLS/UL (ref 15–500)
EOSINOPHIL NFR BLD AUTO: 1.3 %
ERYTHROCYTE [DISTWIDTH] IN BLOOD BY AUTOMATED COUNT: 12.6 % (ref 11–15)
GLUCOSE SERPL-MCNC: 95 MG/DL (ref 65–99)
HCT VFR BLD AUTO: 42.5 % (ref 35–45)
HDLC SERPL-MCNC: 41 MG/DL
HGB BLD-MCNC: 14.1 G/DL (ref 11.7–15.5)
LDLC SERPL CALC-MCNC: 98 MG/DL (CALC)
LYMPHOCYTES # BLD AUTO: 2452 CELLS/UL (ref 850–3900)
LYMPHOCYTES NFR BLD AUTO: 36.6 %
MCH RBC QN AUTO: 30.2 PG (ref 27–33)
MCHC RBC AUTO-ENTMCNC: 33.2 G/DL (ref 32–36)
MCV RBC AUTO: 91 FL (ref 80–100)
MONOCYTES # BLD AUTO: 462 CELLS/UL (ref 200–950)
MONOCYTES NFR BLD AUTO: 6.9 %
NEUTROPHILS # BLD AUTO: 3672 CELLS/UL (ref 1500–7800)
NEUTROPHILS NFR BLD AUTO: 54.8 %
NONHDLC SERPL-MCNC: 132 MG/DL (CALC)
PLATELET # BLD AUTO: 287 THOUSAND/UL (ref 140–400)
PMV BLD REES-ECKER: 11.4 FL (ref 7.5–12.5)
POTASSIUM SERPL-SCNC: 4.1 MMOL/L (ref 3.5–5.3)
PROT SERPL-MCNC: 7.2 G/DL (ref 6.1–8.1)
RBC # BLD AUTO: 4.67 MILLION/UL (ref 3.8–5.1)
SODIUM SERPL-SCNC: 139 MMOL/L (ref 135–146)
TRIGL SERPL-MCNC: 219 MG/DL
TSH SERPL-ACNC: 1.02 MIU/L
WBC # BLD AUTO: 6.7 THOUSAND/UL (ref 3.8–10.8)

## 2025-05-12 ENCOUNTER — APPOINTMENT (OUTPATIENT)
Dept: BEHAVIORAL HEALTH | Facility: CLINIC | Age: 25
End: 2025-05-12
Payer: COMMERCIAL

## 2025-05-12 DIAGNOSIS — G44.89 CHRONIC MIXED HEADACHE SYNDROME: ICD-10-CM

## 2025-05-12 DIAGNOSIS — G47.8: ICD-10-CM

## 2025-05-12 DIAGNOSIS — G90.1 DYSAUTONOMIA (MULTI): ICD-10-CM

## 2025-05-12 DIAGNOSIS — K59.09 CHRONIC CONSTIPATION: ICD-10-CM

## 2025-05-12 DIAGNOSIS — F41.3 OTHER MIXED ANXIETY DISORDERS: ICD-10-CM

## 2025-05-12 DIAGNOSIS — F41.1 GAD (GENERALIZED ANXIETY DISORDER): ICD-10-CM

## 2025-05-12 DIAGNOSIS — F91.8: ICD-10-CM

## 2025-05-12 DIAGNOSIS — Z86.59 PSYCHIATRIC FOLLOW-UP: ICD-10-CM

## 2025-05-12 DIAGNOSIS — F84.0 AUTISM SPECTRUM DISORDER (HHS-HCC): ICD-10-CM

## 2025-05-12 DIAGNOSIS — F90.2 ATTENTION DEFICIT HYPERACTIVITY DISORDER (ADHD), COMBINED TYPE: ICD-10-CM

## 2025-05-12 DIAGNOSIS — F42.8 OTHER OBSESSIVE-COMPULSIVE DISORDER: ICD-10-CM

## 2025-05-12 DIAGNOSIS — Z09 PSYCHIATRIC FOLLOW-UP: ICD-10-CM

## 2025-05-12 DIAGNOSIS — Q93.52 PHELAN-MCDERMID 22Q13 DELETION SYNDROME: ICD-10-CM

## 2025-05-12 DIAGNOSIS — F42.4 COMPULSIVE SKIN PICKING: ICD-10-CM

## 2025-05-12 DIAGNOSIS — F79 INTELLECTUAL DISABILITY: ICD-10-CM

## 2025-05-12 PROCEDURE — 99214 OFFICE O/P EST MOD 30 MIN: CPT | Performed by: STUDENT IN AN ORGANIZED HEALTH CARE EDUCATION/TRAINING PROGRAM

## 2025-05-12 PROCEDURE — G2211 COMPLEX E/M VISIT ADD ON: HCPCS | Performed by: STUDENT IN AN ORGANIZED HEALTH CARE EDUCATION/TRAINING PROGRAM

## 2025-05-12 NOTE — PATIENT INSTRUCTIONS
AFTER VISIT SUMMARY      Date: 5/12/2025  Appointment with Psychiatrist - Dr. Rodriguez      Reason for Visit:  -Routine follow-up/Medication management      Discussed during Appointment:   -Physical health, psychiatric/behavioral symptoms, daily functioning, new issues/concerns     -Treatment plan/management, including medication      Clinical Impression/Status Update:  -No report of significant change in patient's overall psychiatric and behavioral condition since last visit.  -No report of significant new issues/concerns.  -Current medication regimen appears to be appropriately effective without experiencing significant or bothersome side effects.  --We will continue current medications and see her again in 3 to 4 months.      #Clinic Policies/Procedures  -Refills  Prescriptions will be sent to your pharmacy with enough refills to last until your next appointment. For established patients, we typically provide 6 refills for regular meds and 3 refills for controlled substances (e.g., ADHD stimulant medication, benzodiazepines such as lorazepam). We will not provide additional refills beyond that without having an appointment. You can schedule an appointment by sending a [employer] message or calling our office at [phone].     -Paperwork Requests (e.g., Expert Eval for guardianship)  We ask that you please schedule an appointment if needing paperwork filled out by the doctor (e.g., Expert Eval, FMLA form).  Please provide as much information as possible about your request and email the doctor any forms needing to be filled out prior to the appointment.       ===========================  INSTRUCTIONS/RECOMMENDATIONS  ===========================    #Medication   -No medication changes made today  --Continue taking your psych medications the same as usual    --Refills will be sent to your pharmacy      >>For prior authorization issues at the pharmacy -> Call the office and ask to talk to Nurse Garces.      If having  technical Issues with Epic or [facility]-> Please help us improve the Epic experience  To help identify issues needing to be fixed and improve patient care, please report any issues you experience with Epic or  [facility], such as difficulty connecting to video during virtual visits.  [phone]      #Follow-up  --Your next appointment is scheduled for 9/8/2025 at 3:00 pm (virtual visit with [employer])    *If having new/worsening symptoms, please send a [facility] [employer] [facility] message or call the clinic ([phone]) to discuss being seen sooner   >>If unable to reach the office, send me an email at [email or web address]

## 2025-05-12 NOTE — PROGRESS NOTES
OTHERS ATTENDING APPOINTMENT:  -Mother  *Presence necessary as independent historian given patient's intellectual/developmental disability and/or impaired communication abilities    LAST INTERVENTION  Last seen about 4 months ago in January 2025. No report of significant change in patient's overall psychiatric and behavioral condition. Mother decreased NAC dose form twice daily to once daily. No medication changes.      HISTORY OF PRESENT ILLNESS    #Interval Change  -No report of significant change in patient's overall psychiatric and behavioral condition since last visit.  -No report of significant new issues/concerns.      #Anxiety  Stable.  -No report of significant restlessness, pacing, or other signs suggesting psychomotor agitation.   -No report of signs/symptoms consistent with separation anxiety or panic attacks.  Recall from prior: Seeming anxious and more perseverating in the setting of poor sleep. Difficulty focusing. Baseline anxiety typically consists of asking questions repeatedly and getting preoccupied with something that is worrisome to her.         #Behavior  Stable.  -No report of challenging behavior since last visit.  Recall from prior: Sitting up, and going back to bed as reviewed by video camera. Mother noted that patient wasn't awake. Agitation and destruction of property. Patient is described as having times where she will ignore and/or refuse to follow instructions and engage in desired activity on her own terms instead. Mother explains that patient does what she wants when she wants to. Will not wait patiently like before. Also, will reportedly “do what she needs to” in order to be able to do what she wants, including pushing/shoving and at times hitting others. Once patient has done what she wanted to do, the behavior terminates on its own and in most cases patient will be her usual self the rest of the time. Overall course of this sort of behavior has varied in presence/frequency. Comes  and goes. Can last up to a few days. Typically resolves in less than a week. Occurs in finite episodes rather than all the time during those days. After an incident patient goes back to her usual self the rest of the day. Severity has remained stable since onset. This behavior per mum never gets severe or frequent enough to call our office about challenging behavior. No report of significant self-injurious behavior.      #Sleep  Stable. No report of sleep disturbance.  Recall from prior: Patient typically goes to bed at 10:30 pm and wakes up at 6:30 am. Mother reports that patient goes to bed earlier on some days as soon as it gets dark when there is no provider.  Recall from prior: Patient still rolls while on her bed but no more sitting up except when she wakes up at 5 am to use the bathroom.       #Medication  -Endorses medication adherence.  -No report of concerns for significant medication side effects.   -No report of significant change in frequency of use of PRNs for behaviors/agitation.       #Health  Stable.  -No report of hospitalizations, ED visits, new/worsening medical issues, or changes in non-psych medication since last visit.   *Rare migraines consistent with baseline.          REVIEW OF SYMPTOMS  -Depression: negative  -Anxiety: see HPI  -Psychosis: negative  -Kathrine: negative  -Aggression: see HPI  -Self-injury: skin-picking, no worse than baseline  -Sleep: as per HPI  -Appetite: No issues reported. No report of pica. No changes from baseline  -Medical ROS: patient does not endorse difficulty urinating, constipation, seizures, rash, or polydipsia.  *All other systems have been reviewed and are negative for complaint unless otherwise noted above      PSYCHIATRIC HISTORY  No prior hospitalizations  There was a concern for staring spells and possible seizures when patient was a child. Has series of EEGs There were some results that suggested signs of seizures. Eventually, it was decided not to be  treated and eventually went away before her diagnosis of Yelena McDermid syndrome. Last normal EEG was in 2006.      MEDICATION HISTORY  Amitriptyline - worse aggression?  Clomipramine - worse aggression?  MPH stimulants (Metadate) - unknown outcome  Guanfacine - unclear benefit  Vyvanse - helpful initially but recently causing some aggression      MEDICAL HISTORY  PCP: Dr. 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  Behavior: distant, mildly avoidant  Communication: Stereotypic words/phrases.  Motor: No involuntary movements.  MSK: No TD, tics, or tremor appreciated. AIMS 0.   Mood: Euthymic  Affect: Full range. Appropriately reactive; smiles/laughs when watching something funny.  Thought processes: Coherent. Greer  Thought content: Unable to assess due to limited verbal abilities and ID  Perception: Does not appear to be experiencing hallucinations.  Orientation: Responds to her name.  Intellectual ability: Impaired.  Attention/concentration: alert; attentive.  Insight: Poor  Judgment: Poor         RISK ASSESSMENT   Patient felt to be at low acute and imminent risk of harm to self and others based on consideration of her risk and protective factors, as well as evaluation of her current clinical  condition. She does not currently meet criteria for inpatient psychiatric hospitalization given that she does not exhibit evidence of significant deterioration in baseline psychiatric illness, aggression, self-injury, and ability to care for self. There is no evidence that patient's current caregivers/living environment are unable to safely manage patient's behavior. Overall risk mitigated by continued psychiatric follow-up care, psychopharmacologic intervention, 24/7 supervision, and [facility] services. Patient/caregivers are aware of emergency psychiatric resources available in the event of an acute psychiatric emergency, Mobile [facility], 911, and local ER.        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       ###  AS OF THIS APPOINTMENT:  -No significant change in patient's overall psychiatric and behavioral condition since last visit.  -No significant new issues/concerns.  -Appears to be tolerating medication regimen without report of significant or bothersome side effects.  -Will continue current medication regimen and plan for follow-up in about 3 to 4 months.  ###        Diagnoses:  -Yelena-McDermid syndrome  -Intellectual disability  -ASD  -OCD  -GRCAE  -ADHD  -impulse control disorder (compulsive skin-picking)  -Multiple medical comorbidities      PLAN/MANAGEMENT/RECOMMENDATIONS               #Visit Complexity  -Visit of high complexity given patient's  intellectual/developmental disability and/or impaired communication abilities resulting in need for the presence of a third party (mother) to provide clinical information and history.      #Medication Management  -Continue:   --Trazodone 200mg QHS  --nortriptyline 50mg QHS for ADHD and migraines  --cyproheptadine 12mg QHS for migraines  --NAC purchased OTC for OCD/skin picking-Mother administers twice daily.  --melatonin 5 mg daily  -PRN:  --Clonidine 0.1 mg PRN up to 3 times daily. To repeat after 45 minutes if first dose ineffective    #Medication Considerations  -Medication consent/assent:   Risks, benefits, alternatives, off-label uses, black box warnings, and frequent/important side effects of medications have been discussed with patient/caregiver. To the extent possible, they have voiced understanding and agreement with recommended use of psychotropic medication.     -Medical necessity for continued treatment with psychotropic medication:      [] Symptom reduction        [] Improvement in functioning      [x] Maintenance therapy to reduce risk of harm to self/others      [x] Maintenance therapy to prevent deterioration in functioning      -Rational for not reducing medication dose at this time:           [x] Significant risk of deterioration in functioning       [x] Concern for elevating risk of harm to self/others      [] Prior dose reduction unsuccessful and/or harmful      [] Psychiatric/behavioral condition not adequately stabilized/optimized       [x] Medication regimen recently modified          -OARRS  --I have personally reviewed patient's OARRS report. I have considered the risks of abuse, dependence, addiction, and diversion and feel that the potential benefits of treatment with a controlled substance currently outweigh the potential risks.        -Risk/Benefit assessment:  There is no report of signs/symptoms consistent with medication-induced impairment in daily functioning. At this time, benefits  of medication felt to outweigh potential risks. But we will continue to reassess need for psychotropic medication at regular 3 to 6 month intervals, or sooner as clinically indicated.         #Medication Monitoring Plan:   -Labs next due: April 2025  --Labs to monitor: CBC, CMP, TSH/T4, lipid panel, Hgb A1c, EKG      #Psychotherapy with E/M services provided as documented above       #MDM/Complexity Issues    [] Chronic medical comorbidities     [x] Chronic risk of harm to self/others     [x] Intellectual/Developmental disability     [x] Need for independent historian due to intellectual/developmental disability and/or           impaired communication abilities     [] Controlled substance medication requiring regular monitoring     [x] Medication requiring significant ongoing monitoring for toxicity     #Counseling Provided     [x] Diagnostic impression/prognosis      [x] Risks and benefits of treatment options     [x] Instruction for management/treatment and follow-up     [x] Educated patient/caregiver about: behavioral interventions, sleep hygiene, safety planning                  #Coordination of care provided    [x] Family    [] Caregiver/DSP/Staff       [] Agency supervisor       [] Nursing staff      [] SSA/          [] Therapist                     [] Guardian         #Follow-up      -in 3 to 4 months, or sooner if new/worsening symptoms         >> Scheduled virtual Follow-up Appt on 9/8/2025 at 15:00         Time:  Prep time on date of the patient encounter: 5 minutes  Time spent directly with patient/family/caregiver: 45 minutes  Additional time spent on patient care activities: 10 minutes   Documentation time: 10 minutes  Total time on date of patient encounter: 70 minutes        Scribe Attestation  By signing my name below, IArely, Scribe   attest that this documentation has been prepared under the direction and in the presence of Ilana Rodriguez DO.

## 2025-05-29 DIAGNOSIS — Z09 PSYCHIATRIC FOLLOW-UP: ICD-10-CM

## 2025-05-29 DIAGNOSIS — Z86.59 PSYCHIATRIC FOLLOW-UP: ICD-10-CM

## 2025-09-08 ENCOUNTER — APPOINTMENT (OUTPATIENT)
Dept: BEHAVIORAL HEALTH | Facility: CLINIC | Age: 25
End: 2025-09-08
Payer: MEDICARE

## 2025-09-15 ENCOUNTER — APPOINTMENT (OUTPATIENT)
Dept: BEHAVIORAL HEALTH | Facility: CLINIC | Age: 25
End: 2025-09-15
Payer: MEDICARE

## 2026-04-20 ENCOUNTER — APPOINTMENT (OUTPATIENT)
Dept: PRIMARY CARE | Facility: CLINIC | Age: 26
End: 2026-04-20
Payer: MEDICAID